# Patient Record
Sex: FEMALE | Race: WHITE | NOT HISPANIC OR LATINO | Employment: UNEMPLOYED | URBAN - METROPOLITAN AREA
[De-identification: names, ages, dates, MRNs, and addresses within clinical notes are randomized per-mention and may not be internally consistent; named-entity substitution may affect disease eponyms.]

---

## 2017-02-28 ENCOUNTER — OFFICE VISIT - HEALTHEAST (OUTPATIENT)
Dept: FAMILY MEDICINE | Facility: CLINIC | Age: 32
End: 2017-02-28

## 2017-02-28 DIAGNOSIS — J34.89 SINUS PRESSURE: ICD-10-CM

## 2017-02-28 DIAGNOSIS — J06.9 URI (UPPER RESPIRATORY INFECTION): ICD-10-CM

## 2017-09-17 ENCOUNTER — HEALTH MAINTENANCE LETTER (OUTPATIENT)
Age: 32
End: 2017-09-17

## 2018-01-31 ENCOUNTER — OFFICE VISIT - HEALTHEAST (OUTPATIENT)
Dept: FAMILY MEDICINE | Facility: CLINIC | Age: 33
End: 2018-01-31

## 2018-01-31 DIAGNOSIS — Z72.820 POOR SLEEP: ICD-10-CM

## 2018-01-31 DIAGNOSIS — F41.9 ANXIETY: ICD-10-CM

## 2018-06-21 ENCOUNTER — OFFICE VISIT - HEALTHEAST (OUTPATIENT)
Dept: FAMILY MEDICINE | Facility: CLINIC | Age: 33
End: 2018-06-21

## 2018-06-21 DIAGNOSIS — N89.8 VAGINAL DISCHARGE: ICD-10-CM

## 2018-06-21 LAB
CLUE CELLS: NORMAL
TRICHOMONAS, WET PREP: NORMAL
YEAST, WET PREP: NORMAL

## 2018-06-22 ENCOUNTER — AMBULATORY - HEALTHEAST (OUTPATIENT)
Dept: LAB | Facility: CLINIC | Age: 33
End: 2018-06-22

## 2018-06-22 DIAGNOSIS — F25.0 SCHIZOAFFECTIVE DISORDER, BIPOLAR TYPE (H): ICD-10-CM

## 2018-06-22 DIAGNOSIS — F41.9 ANXIETY DISORDER: ICD-10-CM

## 2018-06-22 LAB
C TRACH DNA SPEC QL PROBE+SIG AMP: NEGATIVE
N GONORRHOEA DNA SPEC QL NAA+PROBE: NEGATIVE

## 2018-08-15 ENCOUNTER — OFFICE VISIT - HEALTHEAST (OUTPATIENT)
Dept: FAMILY MEDICINE | Facility: CLINIC | Age: 33
End: 2018-08-15

## 2018-08-15 DIAGNOSIS — N76.0 BV (BACTERIAL VAGINOSIS): ICD-10-CM

## 2018-08-15 DIAGNOSIS — N89.8 VAGINAL DISCHARGE: ICD-10-CM

## 2018-08-15 DIAGNOSIS — B96.89 BV (BACTERIAL VAGINOSIS): ICD-10-CM

## 2018-08-15 LAB
CLUE CELLS: ABNORMAL
TRICHOMONAS, WET PREP: ABNORMAL
YEAST, WET PREP: ABNORMAL

## 2018-09-24 ENCOUNTER — HEALTH MAINTENANCE LETTER (OUTPATIENT)
Age: 33
End: 2018-09-24

## 2019-06-11 ENCOUNTER — OFFICE VISIT - HEALTHEAST (OUTPATIENT)
Dept: FAMILY MEDICINE | Facility: CLINIC | Age: 34
End: 2019-06-11

## 2019-06-11 DIAGNOSIS — K21.9 GASTROESOPHAGEAL REFLUX DISEASE, ESOPHAGITIS PRESENCE NOT SPECIFIED: ICD-10-CM

## 2019-06-13 ENCOUNTER — COMMUNICATION - HEALTHEAST (OUTPATIENT)
Dept: FAMILY MEDICINE | Facility: CLINIC | Age: 34
End: 2019-06-13

## 2019-08-29 ENCOUNTER — OFFICE VISIT - HEALTHEAST (OUTPATIENT)
Dept: FAMILY MEDICINE | Facility: CLINIC | Age: 34
End: 2019-08-29

## 2019-08-29 DIAGNOSIS — N92.6 ABNORMAL MENSTRUAL CYCLE: ICD-10-CM

## 2019-08-29 LAB — HCG UR QL: NEGATIVE

## 2019-11-25 ENCOUNTER — COMMUNICATION - HEALTHEAST (OUTPATIENT)
Dept: SCHEDULING | Facility: CLINIC | Age: 34
End: 2019-11-25

## 2019-11-26 ENCOUNTER — COMMUNICATION - HEALTHEAST (OUTPATIENT)
Dept: FAMILY MEDICINE | Facility: CLINIC | Age: 34
End: 2019-11-26

## 2019-12-19 ENCOUNTER — RECORDS - HEALTHEAST (OUTPATIENT)
Dept: EMERGENCY MEDICINE | Facility: HOSPITAL | Age: 34
End: 2019-12-19

## 2019-12-19 ENCOUNTER — RECORDS - HEALTHEAST (OUTPATIENT)
Dept: ADMINISTRATIVE | Facility: OTHER | Age: 34
End: 2019-12-19

## 2019-12-23 LAB
ATRIAL RATE - MUSE: 86 BPM
DIASTOLIC BLOOD PRESSURE - MUSE: NORMAL
INTERPRETATION ECG - MUSE: NORMAL
P AXIS - MUSE: 52 DEGREES
PR INTERVAL - MUSE: 156 MS
QRS DURATION - MUSE: 82 MS
QT - MUSE: 366 MS
QTC - MUSE: 437 MS
R AXIS - MUSE: 59 DEGREES
SYSTOLIC BLOOD PRESSURE - MUSE: NORMAL
T AXIS - MUSE: 22 DEGREES
VENTRICULAR RATE- MUSE: 86 BPM

## 2020-06-13 ENCOUNTER — HOSPITAL ENCOUNTER (OUTPATIENT)
Dept: OBGYN | Facility: HOSPITAL | Age: 35
Discharge: HOME | End: 2020-06-13
Attending: OBSTETRICS & GYNECOLOGY | Admitting: OBSTETRICS & GYNECOLOGY

## 2020-06-13 DIAGNOSIS — R10.84 ABDOMINAL PAIN, GENERALIZED: ICD-10-CM

## 2020-06-13 DIAGNOSIS — G47.9 SLEEP DISORDER: ICD-10-CM

## 2020-06-13 LAB
ALBUMIN UR-MCNC: NEGATIVE MG/DL
APPEARANCE UR: CLEAR
BILIRUB UR QL STRIP: NEGATIVE
COLOR UR AUTO: YELLOW
GLUCOSE UR STRIP-MCNC: NEGATIVE MG/DL
HGB UR QL STRIP: NEGATIVE
KETONES UR STRIP-MCNC: NEGATIVE MG/DL
LEUKOCYTE ESTERASE UR QL STRIP: NEGATIVE
NITRATE UR QL: NEGATIVE
PH UR STRIP: 7 [PH] (ref 4.5–8)
SP GR UR STRIP: 1 (ref 1–1.03)
UROBILINOGEN UR STRIP-ACNC: NORMAL

## 2020-06-13 ASSESSMENT — MIFFLIN-ST. JEOR: SCORE: 1588.12

## 2020-07-18 ENCOUNTER — HOSPITAL ENCOUNTER (OUTPATIENT)
Dept: OBGYN | Facility: HOSPITAL | Age: 35
Discharge: HOME | End: 2020-07-19
Attending: OBSTETRICS & GYNECOLOGY | Admitting: OBSTETRICS & GYNECOLOGY

## 2020-07-19 LAB — RUPTURE OF FETAL MEMBRANES BY ROM PLUS: NEGATIVE

## 2020-07-24 ENCOUNTER — AMBULATORY - HEALTHEAST (OUTPATIENT)
Dept: OBGYN | Facility: CLINIC | Age: 35
End: 2020-07-24

## 2020-07-24 DIAGNOSIS — Z33.1 PREGNANCY, INCIDENTAL: ICD-10-CM

## 2020-07-27 ENCOUNTER — AMBULATORY - HEALTHEAST (OUTPATIENT)
Dept: FAMILY MEDICINE | Facility: CLINIC | Age: 35
End: 2020-07-27

## 2020-07-27 DIAGNOSIS — Z33.1 PREGNANCY, INCIDENTAL: ICD-10-CM

## 2020-07-27 LAB
SARS-COV-2 PCR COMMENT: NORMAL
SARS-COV-2 RNA SPEC QL NAA+PROBE: NEGATIVE
SARS-COV-2 VIRUS SPECIMEN SOURCE: NORMAL

## 2020-07-29 ENCOUNTER — HOSPITAL ENCOUNTER (OUTPATIENT)
Dept: OBGYN | Facility: HOSPITAL | Age: 35
Discharge: HOME | End: 2020-07-29

## 2020-07-30 ENCOUNTER — ANESTHESIA - HEALTHEAST (OUTPATIENT)
Dept: OBGYN | Facility: HOSPITAL | Age: 35
End: 2020-07-30

## 2020-07-31 ENCOUNTER — HOME CARE/HOSPICE - HEALTHEAST (OUTPATIENT)
Dept: HOME HEALTH SERVICES | Facility: HOME HEALTH | Age: 35
End: 2020-07-31

## 2020-07-31 ENCOUNTER — COMMUNICATION - HEALTHEAST (OUTPATIENT)
Dept: SCHEDULING | Facility: CLINIC | Age: 35
End: 2020-07-31

## 2020-08-02 ENCOUNTER — HOME CARE/HOSPICE - HEALTHEAST (OUTPATIENT)
Dept: HOME HEALTH SERVICES | Facility: HOME HEALTH | Age: 35
End: 2020-08-02

## 2020-11-25 ENCOUNTER — COMMUNICATION - HEALTHEAST (OUTPATIENT)
Dept: SCHEDULING | Facility: CLINIC | Age: 35
End: 2020-11-25

## 2020-12-27 ENCOUNTER — OFFICE VISIT - HEALTHEAST (OUTPATIENT)
Dept: FAMILY MEDICINE | Facility: CLINIC | Age: 35
End: 2020-12-27

## 2020-12-27 ENCOUNTER — AMBULATORY - HEALTHEAST (OUTPATIENT)
Dept: FAMILY MEDICINE | Facility: CLINIC | Age: 35
End: 2020-12-27

## 2020-12-27 DIAGNOSIS — Z20.822 SUSPECTED COVID-19 VIRUS INFECTION: ICD-10-CM

## 2020-12-29 ENCOUNTER — COMMUNICATION - HEALTHEAST (OUTPATIENT)
Dept: SCHEDULING | Facility: CLINIC | Age: 35
End: 2020-12-29

## 2021-01-20 ENCOUNTER — SURGERY - HEALTHEAST (OUTPATIENT)
Dept: SURGERY | Facility: HOSPITAL | Age: 36
End: 2021-01-20

## 2021-01-20 ENCOUNTER — COMMUNICATION - HEALTHEAST (OUTPATIENT)
Dept: SCHEDULING | Facility: CLINIC | Age: 36
End: 2021-01-20

## 2021-01-20 ENCOUNTER — ANESTHESIA - HEALTHEAST (OUTPATIENT)
Dept: SURGERY | Facility: HOSPITAL | Age: 36
End: 2021-01-20

## 2021-01-20 ENCOUNTER — HOSPITAL ENCOUNTER (EMERGENCY)
Dept: SURGERY | Facility: HOSPITAL | Age: 36
Discharge: HOME | End: 2021-01-20
Attending: STUDENT IN AN ORGANIZED HEALTH CARE EDUCATION/TRAINING PROGRAM | Admitting: SPECIALIST
Payer: COMMERCIAL

## 2021-01-20 DIAGNOSIS — K80.50 BILIARY COLIC: ICD-10-CM

## 2021-01-20 DIAGNOSIS — K80.20 CALCULUS OF GALLBLADDER WITHOUT CHOLECYSTITIS WITHOUT OBSTRUCTION: ICD-10-CM

## 2021-01-20 DIAGNOSIS — K81.0 ACUTE CHOLECYSTITIS: ICD-10-CM

## 2021-01-20 LAB
ALBUMIN SERPL-MCNC: 4.2 G/DL (ref 3.5–5)
ALBUMIN UR-MCNC: NEGATIVE MG/DL
ALP SERPL-CCNC: 92 U/L (ref 45–120)
ALT SERPL W P-5'-P-CCNC: 11 U/L (ref 0–45)
ANION GAP SERPL CALCULATED.3IONS-SCNC: 8 MMOL/L (ref 5–18)
APPEARANCE UR: CLEAR
AST SERPL W P-5'-P-CCNC: 16 U/L (ref 0–40)
BILIRUB DIRECT SERPL-MCNC: 0.1 MG/DL
BILIRUB SERPL-MCNC: 0.3 MG/DL (ref 0–1)
BILIRUB UR QL STRIP: NEGATIVE
BUN SERPL-MCNC: 14 MG/DL (ref 8–22)
CALCIUM SERPL-MCNC: 9.7 MG/DL (ref 8.5–10.5)
CHLORIDE BLD-SCNC: 107 MMOL/L (ref 98–107)
CO2 SERPL-SCNC: 24 MMOL/L (ref 22–31)
COLOR UR AUTO: YELLOW
CREAT SERPL-MCNC: 0.7 MG/DL (ref 0.6–1.1)
ERYTHROCYTE [DISTWIDTH] IN BLOOD BY AUTOMATED COUNT: 13.3 % (ref 11–14.5)
GFR SERPL CREATININE-BSD FRML MDRD: >60 ML/MIN/1.73M2
GLUCOSE BLD-MCNC: 93 MG/DL (ref 70–125)
GLUCOSE UR STRIP-MCNC: NEGATIVE MG/DL
HCG UR QL: NEGATIVE
HCT VFR BLD AUTO: 38.7 % (ref 35–47)
HGB BLD-MCNC: 12.6 G/DL (ref 12–16)
HGB UR QL STRIP: NEGATIVE
KETONES UR STRIP-MCNC: NEGATIVE MG/DL
LEUKOCYTE ESTERASE UR QL STRIP: NEGATIVE
LIPASE SERPL-CCNC: 40 U/L (ref 0–52)
MCH RBC QN AUTO: 30.9 PG (ref 27–34)
MCHC RBC AUTO-ENTMCNC: 32.6 G/DL (ref 32–36)
MCV RBC AUTO: 95 FL (ref 80–100)
NITRATE UR QL: NEGATIVE
PH UR STRIP: 7 [PH] (ref 4.5–8)
PLATELET # BLD AUTO: 284 THOU/UL (ref 140–440)
PMV BLD AUTO: 10.8 FL (ref 8.5–12.5)
POC PREG URINE (HCG) HE - HISTORICAL: NEGATIVE
POCT KIT EXPIRATION DATE HE - HISTORICAL: NORMAL
POCT KIT LOT NUMBER HE - HISTORICAL: NORMAL
POCT NEGATIVE CONTROL HE - HISTORICAL: NORMAL
POCT POSITIVE CONTROL HE - HISTORICAL: NORMAL
POTASSIUM BLD-SCNC: 3.8 MMOL/L (ref 3.5–5)
PROT SERPL-MCNC: 7.5 G/DL (ref 6–8)
RBC # BLD AUTO: 4.08 MILL/UL (ref 3.8–5.4)
SARS-COV-2 PCR RESULT-HE - HISTORICAL: POSITIVE
SODIUM SERPL-SCNC: 139 MMOL/L (ref 136–145)
SP GR UR STRIP: 1.01 (ref 1–1.03)
UROBILINOGEN UR STRIP-ACNC: NORMAL
WBC: 10.3 THOU/UL (ref 4–11)

## 2021-01-20 ASSESSMENT — MIFFLIN-ST. JEOR
SCORE: 1420.29
SCORE: 1420.29

## 2021-01-21 ENCOUNTER — COMMUNICATION - HEALTHEAST (OUTPATIENT)
Dept: SCHEDULING | Facility: CLINIC | Age: 36
End: 2021-01-21

## 2021-01-22 LAB
LAB AP CHARGES (HE HISTORICAL CONVERSION): NORMAL
PATH REPORT.COMMENTS IMP SPEC: NORMAL
PATH REPORT.FINAL DX SPEC: NORMAL
PATH REPORT.GROSS SPEC: NORMAL
PATH REPORT.MICROSCOPIC SPEC OTHER STN: NORMAL
PATH REPORT.RELEVANT HX SPEC: NORMAL
RESULT FLAG (HE HISTORICAL CONVERSION): NORMAL

## 2021-01-26 ENCOUNTER — COMMUNICATION - HEALTHEAST (OUTPATIENT)
Dept: SURGERY | Facility: CLINIC | Age: 36
End: 2021-01-26

## 2021-05-28 ENCOUNTER — RECORDS - HEALTHEAST (OUTPATIENT)
Dept: ADMINISTRATIVE | Facility: CLINIC | Age: 36
End: 2021-05-28

## 2021-06-05 ENCOUNTER — HEALTH MAINTENANCE LETTER (OUTPATIENT)
Age: 36
End: 2021-06-05

## 2021-07-15 VITALS
HEART RATE: 68 BPM | RESPIRATION RATE: 16 BRPM | SYSTOLIC BLOOD PRESSURE: 130 MMHG | DIASTOLIC BLOOD PRESSURE: 88 MMHG | TEMPERATURE: 98 F

## 2021-07-15 VITALS — BODY MASS INDEX: 27.91 KG/M2 | WEIGHT: 172.9 LBS

## 2021-07-15 VITALS — WEIGHT: 163.3 LBS | BODY MASS INDEX: 26.36 KG/M2

## 2021-07-15 VITALS — WEIGHT: 176 LBS | BODY MASS INDEX: 28.41 KG/M2

## 2021-07-15 VITALS — WEIGHT: 177 LBS | BODY MASS INDEX: 28.57 KG/M2

## 2021-07-15 VITALS — WEIGHT: 175 LBS | BODY MASS INDEX: 28.25 KG/M2

## 2021-07-15 VITALS
HEIGHT: 66 IN | BODY MASS INDEX: 24.75 KG/M2 | WEIGHT: 154 LBS | HEIGHT: 66 IN | BODY MASS INDEX: 24.75 KG/M2 | WEIGHT: 154 LBS

## 2021-07-15 VITALS — WEIGHT: 191 LBS | HEIGHT: 66 IN | BODY MASS INDEX: 30.7 KG/M2

## 2021-07-15 VITALS — WEIGHT: 170.6 LBS | BODY MASS INDEX: 27.54 KG/M2

## 2021-07-15 VITALS — WEIGHT: 178.8 LBS | BODY MASS INDEX: 28.86 KG/M2

## 2021-07-15 NOTE — ED TRIAGE NOTES
Patient developed RUQ abdominal pain and nausea at 0200. Took Tums and acetaminophen with no relief. Patient states that she had a similar episode last weekend.

## 2021-07-15 NOTE — TELEPHONE ENCOUNTER
Telephone Encounter by Suzie Hdez at 6/14/2019  2:35 PM     Author: Suzie Hdez Service: -- Author Type: --    Filed: 6/14/2019  2:37 PM Encounter Date: 6/13/2019 Status: Signed    : Suzie Hdez       PRIOR AUTHORIZATION DENIED    Denial Rational: Patient is able to get up to 1 capsule per day for a maximum of 120 days within 365 days.  This is the duration set by patient's plan for PPIs.  This limit is shared across all PPIs.   Also, dose can be made with a lower number of a higher strength.  She can use one 40mg capsule instead of two 20mg capsules         Appeal Information: This medication was denied. If physician would like to appeal because patient has contraindication or allergy to covered medication please write letter of medical necessity and route back to PA team to initiate.  If no further action is needed please close encounter thank you.
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
## 2021-07-15 NOTE — ANESTHESIA POSTPROCEDURE EVALUATION
Patient: Rebecca Bobo  * No procedures listed *  Anesthesia type: epidural    Patient location: Labor and Delivery  Last vitals: No vitals data found for the desired time range.    Post vital signs: stable  Level of consciousness: awake and responds to simple questions  Post-anesthesia pain: pain controlled  Post-anesthesia nausea and vomiting: no  Pulmonary: unassisted, return to baseline  Cardiovascular: stable and blood pressure at baseline  Hydration: adequate  Anesthetic events: no    QCDR Measures:  ASA# 11 - Kizzy-op Cardiac Arrest: ASA11B - Patient did NOT experience unanticipated cardiac arrest  ASA# 12 - Kizzy-op Mortality Rate: ASA12B - Patient did NOT die  ASA# 13 - PACU Re-Intubation Rate: NA - No ETT / LMA used for case  ASA# 10 - Composite Anes Safety: ASA10A - No serious adverse event    Additional Notes:    Patient is comfortable s/p labor epidural. Patient is satisfied with her anesthetic and recovering well. Patient's sensory and motor function in LE are returning to baseline, emptying bladder as expected, minimal to no tenderness at neuraxial insertion site. Advised patient to alert her nurses, physicians, department of anesthesia if anything changes.

## 2021-07-15 NOTE — TELEPHONE ENCOUNTER
New Appointment Needed  What is the reason for the visit:    Pregnancy Confirmation Appt Needed  When was the first day of your last menstrual cycle?: 10/23/19  Have you done a home pregnancy test?: Yes, three-all positive     Provider Preference: Any available  How soon do you need to be seen?: As soon as possible   Okay to leave a detailed message:  Yes

## 2021-07-15 NOTE — OP NOTE
Operative Note    Name:  Rebecca Bobo  PCP:  Provider, No Primary Care  Procedure Date:  1/20/2021      Procedure:  CHOLECYSTECTOMY, LAPAROSCOPIC    Pre-Procedure Diagnosis:  Acute cholecystitis [K81.0]     Post-Procedure Diagnosis:    Same     Surgeon(s):  Zeina Rodríguez MD    Assistant:      Anesthesia Type:  General      Findings:  Somewhat distended gallbladder with multiple stones but no significant inflammation.    Operative Report:    The patient was properly identified and brought to the operating suite where they were placed in the supine position, general anesthetic was administered and prepped and draped in a sterile fashion.  A small incision was made below the umbilicus and a Veress needle passed into the abdominal cavity which was insufflated with carbon dioxide.  A 5mm trocar port was then placed followed by the camera.  Under direct vision a 10 mm port was placed in the epigastrium and two 5 mm ports in the right upper quadrant.  The gallbladder was visualized.  With traction on the gallbladder dissection was carried out in the triangle of Calot where the cystic duct and artery were easily identified clipped and divided.  The gallbladder was removed from the gallbladder bed with electrocautery with no difficulty and pulled up through the epigastric incision.  The port was replaced and the entire area irrigated until clear.  Hemostasis was assured.  All the ports removed and each site closed with subcuticular sutures of 4-0 Monocryl.  Sterile dressings were placed.  Each site was also infiltrated with quarter percent Marcaine for a total of 20 mL.  The patient tolerated the procedure well.      Estimated Blood Loss:   10 mL from 1/20/2021  2:42 PM to 1/20/2021  3:42 PM    Specimens:    ID Type Source Tests Collected by Time   A :  Tissue Gallbladder SURGICAL PATHOLOGY EXAM Zeina Rodríguez MD 1/20/2021 1525          Drains:        Complications:    None    Zeina Rodríguez     Date: 1/20/2021   Time: 3:51 PM
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
## 2021-07-15 NOTE — ANESTHESIA POSTPROCEDURE EVALUATION
Patient: Rebecca Bobo  Procedure(s):  CHOLECYSTECTOMY, LAPAROSCOPIC  Anesthesia type: general    Patient location: PACU  Last vitals:   Vitals Value Taken Time   /77 01/20/21 1800   Temp 37.1  C (98.7  F) 01/20/21 1800   Pulse 66 01/20/21 1800   Resp 16 01/20/21 1800   SpO2 95 % 01/20/21 1800     Post vital signs: stable  Level of consciousness: awake and responds to simple questions  Post-anesthesia pain: pain controlled  Post-anesthesia nausea and vomiting: no  Pulmonary: unassisted, return to baseline  Cardiovascular: stable and blood pressure at baseline  Hydration: adequate  Anesthetic events: no    QCDR Measures:  ASA# 11 - Kizzy-op Cardiac Arrest: ASA11B - Patient did NOT experience unanticipated cardiac arrest  ASA# 12 - Kizzy-op Mortality Rate: ASA12B - Patient did NOT die  ASA# 13 - PACU Re-Intubation Rate: ASA13B - Patient did NOT require a new airway mgmt  ASA# 10 - Composite Anes Safety: ASA10A - No serious adverse event    Additional Notes:

## 2021-07-15 NOTE — TELEPHONE ENCOUNTER
Post-Op Phone Call               Surgeon: Dr. Anne M.D..    Date of Surgery: 1/21/2021   Discharge Date: 1/21/2021    Date/Time Called:   Date: 1/26/2021 Time: 11:55 AM   Attempt: First    Notes:  Doing very well. Minimal pain. Discussed bandage care.     Return to Work Plans?  Expected date: N/A  Do you need anything from us in this regard? No     Post-op appointment made? No        Thank you for your time. Please do not hesitate to call us with any questions or concerns.    Call completed by: Esme Perez

## 2021-07-15 NOTE — TELEPHONE ENCOUNTER
"Pt is calling in about chest pain she is having in her right chest, into her right arm and shoulder. Pt reports she had surgery yesterday, tried to call her surgeon, but was unable to get a hold of him.  Pt reports pain is on and off, and only when she is laying down. Pt reports pain is \"10\". Pt reports if she is laying down for 4 hours it hurts continuously for the 4 hours. Pt also reports difficulty breathing when she is laying down, and is worse when the pain comes on.  Care advice given, and per protocol pt should be evaluated in the ER. Pt does not have a PCP, and has not been able to get a hold of her surgeon. Pt was advised to call back if she has further questions.     Cruz Villarreal RN [facility] Triage/Medication Refill    Reason for Disposition    Pain also present in shoulder(s) or arm(s) or jaw    SEVERE chest pain    Major surgery in the past month    Additional Information    Negative: SEVERE difficulty breathing (e.g., struggling for each breath, speaks in single words)    Negative: Passed out (i.e., fainted, collapsed and was not responding)    Negative: Chest pain lasting longer than 5 minutes and ANY of the following:* Over 50 years old* Over 30 years old and at least one cardiac risk factor (i.e., high blood pressure, diabetes, high cholesterol, obesity, smoker or strong family history of heart disease)* Pain is crushing, pressure-like, or heavy * Took nitroglycerin and chest pain was not relieved* History of heart disease (i.e., angina, heart attack, bypass surgery, angioplasty, CHF)    Negative: Visible sweat on face or sweat dripping down face    Negative: Sounds like a life-threatening emergency to the triager    Negative: Followed an injury to chest    Negative: Difficulty breathing    Negative: Cocaine use within last 3 days    Negative: History of prior 'blood clot' in leg or lungs (i.e., deep vein thrombosis, pulmonary embolism)    Negative: Recent illness requiring prolonged bed " rest (i.e., immobilization)    Negative: Hip or leg fracture in past 2 months (e.g, or had cast on leg or ankle)    Protocols used: CHEST PAIN-A-OH
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
## 2021-07-15 NOTE — ANESTHESIA CARE TRANSFER NOTE
Last vitals:   Vitals:    01/20/21 1600   BP: (!) 149/96   Pulse: 86   Resp: 16   Temp:    SpO2: 96%   Temp 97.3    Patient's level of consciousness is awake  Spontaneous respirations: yes  Maintains airway independently: yes  Dentition unchanged: yes  Oropharynx: oropharynx clear of all foreign objects    QCDR Measures:  ASA# 20 - Surgical Safety Checklist: WHO surgical safety checklist completed prior to induction    PQRS# 430 - Adult PONV Prevention: 4558F - Pt received => 2 anti-emetic agents (different classes) preop & intraop  ASA# 8 - Peds PONV Prevention: NA - Not pediatric patient, not GA or 2 or more risk factors NOT present  PQRS# 424 - Kizzy-op Temp Management: 4559F - At least one body temp DOCUMENTED => 35.5C or 95.9F within required timeframe  PQRS# 426 - PACU Transfer Protocol: - Transfer of care checklist used  ASA# 14 - Acute Post-op Pain: ASA14B - Patient did NOT experience pain >= 7 out of 10

## 2021-07-15 NOTE — PATIENT INSTRUCTIONS - HE
Patient Instructions by April Ordaz MD at 8/29/2019  1:35 PM     Author: April Ordaz MD Service: -- Author Type: Physician    Filed: 8/29/2019  3:32 PM Encounter Date: 8/29/2019 Status: Addendum    : April Ordaz MD (Physician)    Related Notes: Original Note by April Ordaz MD (Physician) filed at 8/29/2019  3:31 PM       1. Follow up with gynecologist within the next 1-2 weeks for menstrual cycles issues  2. If you have any non- ob/gyn concerns, try and be seen at your primary clinic. Or you can be seen by any primary care provider at one of our other [facility] sites  3. If you have any questions, call the clinic number - the number is answered 24/7    Patient Education     Understanding the Normal Menstrual Cycle  Having a period (menstruation) is a normal, healthy part of being a woman. Its also part of the menstrual cycle, a process that makes it possible for women to become pregnant. The first day of your period is the first day of your menstrual cycle.   A woman who has irregular cycles can become pregnant during bleeding. This may not be a true menstrual period.   An egg is released    Eggs are female reproductive cells stored in the ovaries. During each cycle, a woman's hormones trigger an egg, (usually 1), to mature and be released from an ovary. This is called ovulation. The egg then travels from the ovary to a fallopian tube.  The egg travels through a tube  The egg moves through the fallopian tube toward the uterus. If sperm are present in the tube, the egg may be fertilized, and pregnancy could result.  The uterine lining grows thicker  The lining of the uterus is made up of blood, tissue, and fluid. During each cycle, hormones cause the lining to thicken. This helps prepare the uterus to receive and nourish a fertilized egg.   The egg and lining are shed  If pregnancy doesnt happen, the egg and thickened lining of the uterus are no longer needed. They are then shed through  the vagina. This is called a menstrual period.  How long is each cycle?  It is normal for a cycle to take 21 to 34 days. For teenagers, the time between periods might be as much as 45 days. For adults, it will be around a month from the first day of one period to the first day of the next. Thats why you may hear women talk about a monthly cycle, even though cycle length can vary from one month to another, and anywhere from 3 to 5 weeks is normal. Not everyone has a 28-day cycle.    How long does a period last?  Its normal for a period to last 2 to 7 days. Talk to your healthcare provider if your period lasts longer than 7 days for 2 cycles in a row.  Date Last Reviewed: 12/1/2016 2000-2017 [employer]. [address] [place] [address], [place] [address]. All rights reserved. This information is not intended as a substitute for professional medical care. Always follow your healthcare professional's instructions.     Patient Education     Improving Your Fertility  Your doctor may suggest some simple methods to help you get pregnant. Most focus on predicting ovulation. This is the time when sex has the best chance of success. Your doctor may also advise working on other factors that can affect fertility.  Predicting ovulation  Conception is only possible when a woman is ovulating. One signal of ovulation is a surge in the hormone LH. Other signals include changes in body temperature and changes in cervical mucus.  Ovulation predictor kits  One of the best signals of ovulation is a sudden increase in the hormone LH. A simple urine test called an ovulation predictor kit is available at [facility]. It can help pinpoint this surge. Have sex the day you notice the surge. Keep having sex daily over the next 3 days, or as often as your doctor suggests.  Body temperature changes  A womans resting temperature or basal body temperature increases after ovulation.  By this point in the cycle, having sex will not  increase your chances of getting pregnant. However, it is an indication that ovulation occurred.   Cervical mucus changes  Shortly before ovulation a womans cervical mucus gets clear and stretchy. The mucus also increases in quantity. This makes it easier for sperm to travel through the cervix. Not all women notice these changes. But if you do, begin having sex daily until the mucus thickens again.  Working on other factors that affect fertility  Certain lifestyle and health habits can affect fertility. Be sure to talk with your doctor about these issues. You may be able to make some simple changes to improve your chances of conception. Keep in mind, though, that it can take time for healthy changes to improve your fertility.  Weight problems  Being overweight or underweight can affect hormone levels. In women, this can impair ovulation. In men, obesity can decrease sperm count.  Medications, supplements, and herbal remedies  Medications, supplements, and herbal remedies can affect hormone levels in men and women. They can also affect the quantity and quality of sperm. Be sure to tell your doctor about any substances you take.  Sexually transmitted diseases  Sexually transmitted diseases (STDs) can scar the reproductive organs in both men and women. If youve had an STD, be sure to tell your doctor.  Testicular heat  A twyla testicles are normally a few degrees cooler than the rest of his body. When the testicles are too warm, sperm production may decline. Try to avoid excess heat from things like hot tubs and saunas.  Smoking, alcohol, and drugs  Smoking can affect estrogen levels and decrease egg production in women. Men who smoke may have lowered sperm count. Excessive alcohol or drug use can also decrease fertility in both men and women.   Chemical exposure  Certain chemicals can affect hormone levels. Talk with your doctor if youre regularly exposed to strong chemicals. You should also ask about lubricants used  during sex. Some types can be toxic to sperm.  Other factors  Excessive exercise can decrease hormone production. It can also cause irregular menstruation in women. Ongoing stress is another factor that may affect fertility and cause ovulation problems.  Date Last Reviewed: 5/21/2015 2000-2017 [employer]. [address] [place] [address]. All rights reserved. This information is not intended as a substitute for professional medical care. Always follow your healthcare professional's instructions.

## 2021-07-15 NOTE — PROGRESS NOTES
Assessment and Plan     Rebecca was seen today for poss bacterial infection.    Diagnoses and all orders for this visit:    Vaginal discharge  -     Wet Prep, Vaginal  -     Chlamydia trachomatis & Neisseria gonorrhoeae, Amplified Detection         HPI     Chief Complaint   Patient presents with     poss bacterial infection     Pt is prone to BV. Notice the difference in odor and discharge yesterday. Was having intercourse yesterday and smelt different.        Rebecca Mata is a 32 y.o. female seen today for vaginal discharge and odor yesterday that seems improved today.  She is with a new sexual partner after being abstinent for quite some time.  She does have a history of frequent BV although it is been more than a year since her last episode.  She does feel that this episode is similar to previous episodes of BV.     No current outpatient prescriptions on file.     Reviewed and updated: medical history, medications and allergies.     Review of Systems     General: Denies fever, chills, fatigue.  GI: Denies nausea, vomiting, diarrhea, constipation, abdominal/pelvic pain.  : Denies dysuria, polyuria, dyspareunia.     Objective     Vitals:    06/21/18 1800   BP: 120/80   Patient Site: Right Arm   Patient Position: Sitting   Cuff Size: Adult Regular   Pulse: 84   Resp: 16   Temp: 97.9  F (36.6  C)   TempSrc: Oral   SpO2: 100%   Weight: 172 lb 14.4 oz (78.4 kg)        Reviewed vital signs.  General: Appears calm, comfortable. Answers questions quickly and appropriately with clear speech. No apparent distress.  Skin: Pink, warm, dry.  HENT: Normocephalic, atraumatic.  Neck: Supple.  Cardiovascular: Strong, regular radial pulse.  Respiratory: Normal respiratory effort.  Neuro: Memory and cognition appear normal. Normal gait.  Psych: Mood and affect appear normal.   :  External female genitalia without lesions. Introitus is normal, vaginal walls pink and moist without lesions. Cervix appears healthy and pink.      Imaging:   No results found.    Labs:  Recent Results (from the past 24 hour(s))   Wet Prep, Vaginal   Result Value Ref Range    Yeast Result No yeast seen No yeast seen    Trichomonas No Trichomonas seen No Trichomonas seen    Clue Cells, Wet Prep No Clue cells seen No Clue cells seen        Medical Decision-Making     Rebecca is a generally well-appearing 32-year-old female presents with concern for possible BV.  She describes vaginal odor and discharge yesterday but seems better today.  She is with a new sexual partner after a long period of abstinence is concerned that resuming intercourse may have caused a recurrence of BV.  Her appearance is nontoxic.  She is not tachycardic or tachypneic or febrile.  Exam is essentially unremarkable.  There was small amount of clear to milky discharge present in the vaginal canal.  No vaginal odor was noted.  Wet prep was completely negative.  Gonorrhea and chlamydia are pending.  If her symptoms recur, she will follow-up with her PCP.  Advised her that we will contact her once the gonorrhea and chlamydia results are back.    Reviewed red flags that would trigger a prompt return to the clinic as noted below under patient instructions.  She expressed understanding of these directions and is in agreement with the plan.     Patient Instructions     Patient Instructions   The wet prep swab did not show any signs of bacterial vaginosis, yeast infection, or trichomoniasis.  The gonorrhea and chlamydia test will be back for a couple of days but we will call you as soon as they are ready.  If you continue to have symptoms, I suggest following up with your regular doctor for further evaluation.        Discussed benefit vs risk of medications, dosing, side effects.  Patient was able to verbalize understanding.  After visit summary was provided for patient.     William Waggoner PA-C

## 2021-07-15 NOTE — PROGRESS NOTES
Discharge instructions given to patient and her significant other and they verbalized understanding. Discharged home ambulatory.

## 2021-07-15 NOTE — TELEPHONE ENCOUNTER
Central PA team  [phone]  Pool: HE PA MED ([id number])          PA has been initiated.       PA form completed and faxed insurance via Cover My Meds     Key:  [id number]        Medication: Omeprazole 20MG dr capsules    Insurance:  [facility] Medicaid           Response will be received via fax and may take up to 5-10 business days depending on plan

## 2021-07-15 NOTE — TELEPHONE ENCOUNTER
New Appointment Needed  What is the reason for the visit:    Pregnancy Confirmation Appt Needed  When was the first day of your last menstrual cycle?: Oct 23  Have you done a home pregnancy test?: Yes: took two, both came back positive.    Provider Preference: Any available  How soon do you need to be seen?: any available today before two, or anything tomorrow morning   Waitlist offered?: No  Okay to leave a detailed message:  Yes

## 2021-07-15 NOTE — TELEPHONE ENCOUNTER
Fax received from [facility] pharmacy requesting Prior Authorization    Medication Name:   omeprazole (PRILOSEC) 20 MG capsule 60 capsule 1 6/11/2019     Sig - Route: Take 1 capsule (20 mg total) by mouth 2 (two) times a day. - Oral    Sent to pharmacy as: omeprazole (PRILOSEC) 20 MG capsule    E-Prescribing Status: Receipt confirmed by pharmacy (6/11/2019  5:25 PM CDT)          Insurance Plan: GOSIA   PBM:    Patient ID Number: [id number]    Please start PA process

## 2021-07-15 NOTE — PATIENT INSTRUCTIONS - HE
Dear Rebecca Bobo,    Your symptoms show that you may have coronavirus (COVID-19). This illness can cause fever, cough and trouble breathing. Many people get a mild case and get better on their own. Some people can get very sick.    Will I be tested for COVID-19?  We would like to test you for Covid-19 virus. I have placed orders for this test.     To schedule: go to your [employer] home page and scroll down to the section that says  You have an appointment that needs to be scheduled  and click the large green button that says  Schedule Now  and follow the steps to find the next available openings.    If you are unable to complete these [employer] scheduling steps, please call [phone] to schedule your testing.     Return to work/school/ guidance:  Please let your workplace manager and staffing office know when your quarantine ends     We can t give you an exact date as it depends on the above. You can calculate this on your own or work with your manager/staffing office to calculate this. (For example if you were exposed on 10/4, you would have to quarantine for 14 full days. That would be through 10/18. You could return on 10/19.)      If you receive a positive COVID-19 test result, follow the guidance of the those who are giving you the results. Usually the return to work is 10 (or in some cases 20 days from symptom onset.) If you work at [facility], you must also be cleared by Employee Occupational Health and Safety to return to work.        If you receive a negative COVID-19 test result and did not have a high risk exposure to someone with a known positive COVID-19 test, you can return to work once you're free of fever for 24 hours without fever-reducing medication and your symptoms are improving or resolved.      If you receive a negative COVID-19 test and If you had a high risk exposure to someone who has tested positive for COVID-19 then you can return to work 14 days after your last contact  with the positive individual    Note: If you have ongoing exposure to the covid positive person, this quarantine period may be more than 14 days. (For example, if you are continued to be exposed to your child who tested positive and cannot isolate from them, then the quarantine of 7-14 days can't start until your child is no longer contagious. This is typically 10 days from onset of the child's symptoms. So the total duration may be 17-24 days in this case.)    Sign up for [employer].   We know it's scary to hear that you might have COVID-19. We want to track your symptoms to make sure you're okay over the next 2 weeks. Please look for an email from [employer]--this is a free, online program that we'll use to keep in touch. To sign up, follow the link in the email you will receive. Learn more at [email or web address]    How can I take care of myself?    Get lots of rest. Drink extra fluids (unless a doctor has told you not to)    Take Tylenol (acetaminophen) or ibuprofen for fever or pain. If you have liver or kidney problems, ask your family doctor if it's okay to take Tylenol o ibuprofen    If you have other health problems (like cancer, heart failure, an organ transplant or severe kidney disease): Call your specialty clinic if you don't feel better in the next 2 days.    Know when to call 911. Emergency warning signs include:  o Trouble breathing or shortness of breath  o Pain or pressure in the chest that doesn't go away  o Feeling confused like you haven't felt before, or not being able to wake up  o Bluish-colored lips or face    Where can I get more information?  [facility] - About COVID-19:   [email or web address]    CDC - What to Do If You're Sick:   www.cdc.gov/coronavirus/2019-ncov/about/steps-when-sick.html      Dear Rebecca Bobo,    Your symptoms show that you may have coronavirus (COVID-19). This illness can cause fever, cough and trouble breathing. Many people  get a mild case and get better on their own. Some people can get very sick.    Will I be tested for COVID-19?  We would like to test you for Covid-19 virus. I have placed orders for this test.     For all employees or close contacts (except [place] and [employer] - see below), go to your [employer] home page and scroll down to the section that says  You have an appointment that needs to be scheduled  and click the large green button that says  Schedule Now  and follow the steps to find the next available opening.     If you are unable to complete these steps or if you cannot find any available times, please call [phone] to schedule employee testing.     [place] employees or close contacts, please call [phone].   [place] ([employer]) employees or close contacts call [phone].    Return to work/school/ guidance:  Please let your workplace manager and staffing office know when your quarantine ends     We can t give you an exact date as it depends on the above. You can calculate this on your own or work with your manager/staffing office to calculate this. (For example if you were exposed on 10/4, you would have to quarantine for 14 full days. That would be through 10/18. You could return on 10/19.)      If you receive a positive COVID-19 test result, follow the guidance of the those who are giving you the results. Usually the return to work is 10 (or in some cases 20 days from symptom onset.) If you work at [facility], you must also be cleared by Employee Occupational Health and Safety to return to work.        If you receive a negative COVID-19 test result and did not have a high risk exposure to someone with a known positive COVID-19 test, you can return to work once you're free of fever for 24 hours without fever-reducing medication and your symptoms are improving or resolved.      If you receive a negative COVID-19 test and If you had a high risk exposure to someone who has tested positive  for COVID-19 then you can return to work 14 days after your last contact with the positive individual    Note: If you have ongoing exposure to the covid positive person, this quarantine period may be more than 14 days. (For example, if you are continued to be exposed to your child who tested positive and cannot isolate from them, then the quarantine of 7-14 days can't start until your child is no longer contagious. This is typically 10 days from onset of the child's symptoms. So the total duration may be 17-24 days in this case.)    Sign up for [employer].   We know it's scary to hear that you might have COVID-19. We want to track your symptoms to make sure you're okay over the next 2 weeks. Please look for an email from [employer]--this is a free, online program that we'll use to keep in touch. To sign up, follow the link in the email you will receive. Learn more at [email or web address]    How can I take care of myself?    Get lots of rest. Drink extra fluids (unless a doctor has told you not to)    Take Tylenol (acetaminophen) or ibuprofen for fever or pain. If you have liver or kidney problems, ask your family doctor if it's okay to take Tylenol o ibuprofen    If you have other health problems (like cancer, heart failure, an organ transplant or severe kidney disease): Call your specialty clinic if you don't feel better in the next 2 days.    Know when to call 911. Emergency warning signs include:  o Trouble breathing or shortness of breath  o Pain or pressure in the chest that doesn't go away  o Feeling confused like you haven't felt before, or not being able to wake up  o Bluish-colored lips or face    Where can I get more information?   [facility] - About COVID-19:   [email or web address]    CDC - What to Do If You're Sick:   www.cdc.gov/coronavirus/2019-ncov/about/steps-when-sick.html

## 2021-07-15 NOTE — ANESTHESIA PREPROCEDURE EVALUATION
Anesthesia Evaluation      Patient summary reviewed   No history of anesthetic complications     Airway   Mallampati: II  Neck ROM: full   Pulmonary - normal exam     ROS comment: smoker                         Cardiovascular - negative ROS and normal exam   Neuro/Psych - negative ROS     Endo/Other    (+) pregnant     GI/Hepatic/Renal - negative ROS           Dental - normal exam                        Anesthesia Plan  Planned anesthetic: epidural  Patient requesting labor epidural. Patient interviewed and examined at the bedside with RN present throughout. Discussed with patient the procedure of epidural placement, expectations, and risks including but not limited to: decreased blood pressure, poor analgesia possibly requiring replacement, post-dural puncture headache, bleeding, infection, nerve damage, and high spinal block. All questions answered. Patient consents to proceed with epidural placement.    ASA 2     Anesthetic plan and risks discussed with: patient    Post-op plan: routine recovery

## 2021-07-15 NOTE — PROGRESS NOTES
ROM-Plus Negative, Dr Less the in house OB notified of patient's arrival, reactive fetal heart tracing, cervical dilation and Negative ROM-Plus. Order given to discharge patient.

## 2021-07-15 NOTE — PROGRESS NOTES
Patient verbalizes wanting to go home. She states that she has a bottle of mag citrate and would like to try that next. Patient would also like Tylenol and vistaril. Dr. Coombs updated, ok for patient to go home with that plan, will write RX.
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
## 2021-07-15 NOTE — PROGRESS NOTES
Patient received GI cocktail at 1635. No relief. Dr. Coombs updated. Instructions to give patient a fleet enema. Patient taken off monitor, given enema and instructions. Patient will call with questions and after BM

## 2021-07-15 NOTE — TELEPHONE ENCOUNTER
Severe pain on top of stomach and then wraps into back    Wakes up in middle of night    Tums, milk, tylenol  -nothing helping    Rates 10+/10  Constant  Comes on suddenly     No fever  No nausea/vomiting    Triaged to a disposition of go to ED. Patient is agreeable.     COVID 19 Nurse Triage Plan/Patient Instructions    Please be aware that novel coronavirus (COVID-19) may be circulating in the community. If you develop symptoms such as fever, cough, or SOB or if you have concerns about the presence of another infection including coronavirus (COVID-19), please contact your health care provider or visit [email or web address].     Disposition/Instructions    ED Visit recommended. Follow protocol based instructions.      Bring Your Own Device:  Please also bring your smart device(s) (smart phones, tablets, laptops) and their charging cables for your personal use and to communicate with your care team during your visit.      Thank you for taking steps to prevent the spread of this virus.  o Limit your contact with others.  o Wear a simple mask to cover your cough.  o Wash your hands well and often.    Resources    [facility]: About COVID-19: [email or web address]    CDC: What to Do If You're Sick: www.cdc.gov/coronavirus/2019-ncov/about/steps-when-sick.html    CDC: Ending Home Isolation: www.cdc.gov/coronavirus/2019-ncov/hcp/disposition-in-home-patients.html     CDC: Caring for Someone: www.cdc.gov/coronavirus/2019-ncov/if-you-are-sick/care-for-someone.html     [facility]: Interim Guidance for Hospital Discharge to Home: www.health.[facility].[place]/diseases/coronavirus/hcp/hospdischarge.pdf    [facility] clinical trials (COVID-19 research studies): clinicalaffairs.[facility].edu/um-clinical-trials     Below are the COVID-19 hotlines at the [place] Department of Health ([facility]). Interpreters are available.   o For health questions: Call 430-955-7982 or 1-820.158.9253 (7 a.m. to 7 p.m.)  o For questions about  "schools and childcare: Call 454-551-0123 or 1-601.347.2929 (7 a.m. to 7 p.m.)      Reason for Disposition    [1] SEVERE pain (e.g., excruciating) AND [2] present > 1 hour    Additional Information    Negative: Shock suspected (e.g., cold/pale/clammy skin, too weak to stand, low BP, rapid pulse)    Negative: Difficult to awaken or acting confused (e.g., disoriented, slurred speech)    Negative: Passed out (i.e., lost consciousness, collapsed and was not responding)    Negative: Sounds like a life-threatening emergency to the triager    Negative: Chest pain    Negative: Pain is mainly in upper abdomen  (if needed ask: \"is it mainly above the belly button?\")    Negative: Followed an abdomen (stomach) injury    Negative: [1] Abdominal pain AND [2] pregnant < 20 weeks    Negative: [1] Abdominal pain AND [2] pregnant > 20 weeks    Negative: [1] Abdominal pain AND [2] postpartum (from 0 to 6 weeks after delivery)    Protocols used: ABDOMINAL PAIN - FEMALE-A-    Jalyn Swan RN Triage Nurse Advisor    "
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
## 2021-07-15 NOTE — ED TRIAGE NOTES
Pt reports chest pain that started this morning when she woke up at 0800. Pt reports she has had constant right sided chest pain all day. Pt states the pain is sharp and hurts worse with deep breaths and when she lays down. Pt denies any other symptoms. Pt reports she is 8 weeks pregnant.

## 2021-07-15 NOTE — PROGRESS NOTES
Patient admitted into triage room 32 to rule out Rupture of membranes. She states she was bending over at home and felt a gush of fluid that stained her underwear. She felt 2 more other gushes from 10:00PM to 10:30PM and so she decided to come in to be evaluated. She is not wearing a pad and does not feeling fluid running down her legs.She felt 1 big contraction at 8:00PM and none since then. Her last pregnancy was 6 years ago.  Hooked up to [facility] to evaluate.

## 2021-07-15 NOTE — TELEPHONE ENCOUNTER
Please call to have patient continue with Zantac (150 mg two times daily) and follow up with her primary care provider. If she runs out of this med (Zantac), it can be purchased over the counter.    I tried calling her about the Omeprazole issue but she did not  so I left a message to call back.
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
## 2021-07-15 NOTE — PROGRESS NOTES
Pt presented to [facility] c/o left upper quadrant pain since last night. Pt is 33.4 weeks , no hx of  labor, denies bleeding or leaking of fluid, denies issues with pregnancy and issues outside of pregnancy. Vss,afebrile, monitors applied, sent UA/UC rating pain 10/10 with movement paged Dr. Coombs.

## 2021-07-15 NOTE — ANESTHESIA PREPROCEDURE EVALUATION
Anesthesia Evaluation      Patient summary reviewed   No history of anesthetic complications     Airway   Mallampati: II  Neck ROM: full   Pulmonary - normal exam   (+) a smoker                         Cardiovascular - negative ROS and normal exam   Neuro/Psych - negative ROS     Endo/Other - negative ROS      GI/Hepatic/Renal - negative ROS     Comments: Acute cholecystitis     Other findings: COVID positive in January H/o COVID post 12/27/20      Dental - normal exam                        Anesthesia Plan  Planned anesthetic: general endotracheal    ASA 2 - emergent   Induction: intravenous   Anesthetic plan and risks discussed with: patient    Post-op plan: routine recovery

## 2021-07-15 NOTE — ANESTHESIA PROCEDURE NOTES
Epidural Block    Patient location during procedure: OB  Time Called: 7/30/2020 12:02 PM  Reason for Block:labor epidural  Staffing:  Performing  Anesthesiologist: Tim Houston MD  Preanesthetic Checklist  Completed: patient identified, risks, benefits, and alternatives discussed, timeout performed, consent obtained, at patient's request, airway assessed, oxygen available, suction available, emergency drugs available and hand hygiene performed  Procedure  Patient position: sitting  Prep: ChloraPrep and site prepped and draped  Patient monitoring: continuous pulse oximetry, heart rate and blood pressure  Approach: midline  Location: L3-L4  Injection technique: SERA saline  Number of Attempts:1  Needle  Needle type: Tuohy   Needle gauge: 17 G     Catheter in Space: 6  Assessment  Sensory level:  No complications      Additional Notes:  SERA @ 6 cm, taped @ 12 cm

## 2021-07-15 NOTE — ED PROVIDER NOTES
EMERGENCY DEPARTMENT ENCOUNTER      NAME: Rebecca Bobo  AGE: 35 y.o. female  YOB: 1985  MRN: [id number]  EVALUATION DATE & TIME: 1/20/2021  6:59 AM    PCP: Provider, No Primary Care    ED PROVIDER: Nery Montoya M.D.    Chief Complaint   Patient presents with     Abdominal Pain       FINAL IMPRESSION:  1. Biliary colic    2. Calculus of gallbladder without cholecystitis without obstruction        ED COURSE & MEDICAL DECISION MAKING:    ED Course as of Jan 20 0947 Wed Jan 20, 2021   0716 35 y.o. old female who presents to the ED for evaluation of upper abdominal pain.   History and physical examination as documented. Vital signs reassuring. She is holding her abdomen and moaning in pain, appears very uncomfortable. Tenderness in the RUQ. Pain wraps around to her back. Most suspicious for biliary colic. Pancreatitis, gastritis/PUD also on the differential. Unlikely to be ACS given patient's age. No lower abdominal pain or urinary symptoms. UA/UPT ordered in triage. UPT negative. We will check labs, given Dilaudid/Zofran for symptom control, and get RUQ ultrasound.        [AB]   0720 No evidence of UTI.   Urinalysis-UC if Indicated for patients > 12 years [AB]   0846 Labs are reassuring.  No leukocytosis.  Her lipase is normal.  LFTs are also normal.    [AB]   0846 Evidence of gallstones with no pericholecystic fluid. No evidence of cholecystitis.    US Abdomen Limited [AB]   0847 I have reassessed the patient twice and she has some improvement with Dilaudid but is still very uncomfortable. Suspect symptomatic cholelithiasis. Discussed with general surgery who agrees she needs cholecystectomy. Patient ok with this plan as well. Will plan to go to the OR from here and then home afterwards to avoid admission. Additional pain medications ordered.     [AB]   0912 Pain is now gone with Dilaudid and GI cocktail. Awaiting surgery.     [AB]   0945 Surprisingly patient is COVID+. She is asymptomatic. This  resulted after patient had left to go to the OR.    SARS-CoV-2 PCR Result(!!): Positive [AB]      ED Course User Index  [AB] Nery Montoya MD       Scribe time stamps  7:02 AM I met with patient to gather history and perform an initial exam. Plans for ED stay discussed. Provider wore surgical mask and gloves during all interactions with patient.   7:51 AM I rechecked patient.   8:24 AM I rechecked patient. She is still having pain. Plan to discuss with general surgery.  8:35 AM I spoke with Dr. Rodríguez, general surgery. She will take patient to OR today.   8:36 AM I updated patient on plan of care. She is agreeable with plan for surgery today. Plan to order 1mg additional Dilaudid.       MEDICATIONS GIVEN IN THE EMERGENCY:  Medications   sodium chloride flush 10 mL (NS) (10 mL Intravenous Given 1/20/21 0800)   piperacillin-tazobactam 3.375 g in NaCl 0.9 % 50 mL (MINI-BAG Plus) (ZOSYN) (3.375 g Intravenous New Bag 1/20/21 0855)   sodium chloride bacteriostatic 0.9 % injection 0.1-0.3 mL (has no administration in time range)   sodium chloride flush 3 mL (NS) (has no administration in time range)   scopolamine 1.5 mg transdermal patch 1 patch (TRANSDERM-SCOP) (has no administration in time range)   sodium chloride 0.9% 1,000 mL (0 mL Intravenous Stopped 1/20/21 0908)   HYDROmorphone injection 0.5 mg (DILAUDID) (0.5 mg Intravenous Given 1/20/21 0724)   ondansetron injection 4 mg (ZOFRAN) (4 mg Intravenous Given 1/20/21 0724)   HYDROmorphone injection 0.5 mg (DILAUDID) (0.5 mg Intravenous Given 1/20/21 0800)   aluminum-magnesium hydroxide-simethicone 15 mL, viscous lidocaine HC 15 mL (GI COCKTAIL) (30 mL Oral Given 1/20/21 0843)   HYDROmorphone injection 1 mg (DILAUDID) (1 mg Intravenous Given 1/20/21 0854)       NEW PRESCRIPTIONS STARTED AT TODAY'S ER VISIT  Current Discharge Medication List      CONTINUE these medications which have NOT CHANGED    Details   acetaminophen (TYLENOL) 325 MG tablet Take 1-2 tablets (325-650 mg  total) by mouth every 4 (four) hours as needed.  Qty: 90 tablet, Refills: 0    Associated Diagnoses:  (normal spontaneous vaginal delivery)      docusate sodium (COLACE) 100 MG capsule Take 1 capsule (100 mg total) by mouth daily.  Qty: 30 capsule, Refills: 0    Associated Diagnoses:  (normal spontaneous vaginal delivery)      ibuprofen (ADVIL,MOTRIN) 800 MG tablet Take 1 tablet (800 mg total) by mouth every 8 (eight) hours.  Qty: 90 tablet, Refills: 0    Associated Diagnoses:  (normal spontaneous vaginal delivery)      phenazopyridine (PYRIDIUM) 200 MG tablet Take 1 tablet (200 mg total) by mouth 3 (three) times a day.  Qty: 6 tablet, Refills: 0    Associated Diagnoses: Acute cystitis without hematuria      prenatal 25/iron fum/folic/dha (PRENATAL-1 ORAL) Take by mouth.              =================================================================    HPI    Patient information was obtained from: Patient    Use of : N/A     Rebecca Bobo is a 35 y.o. female , , with a pertinent history of bacterial vaginosis who presents to this ED via private car accompanied by family member for evaluation of abdominal pain.     Patient presents with 2-3 hours of worsening RUQ and mid-abdominal pain rated 10/10 and radiating to her right back that woke her from sleep at 4:00 AM this morning. Her pain is continuous, described as aching, burning, and stabbing, and is made worse with palpation. She endorses associated nausea. She had the exact same pain one week ago and states that it resolved on it's own after 4 hours and she was not seen by a doctor for it at that time. She endorses emesis associated with her symptoms last week but denies any emesis today. She states that she recently gave birth to her second child and is unsure when her last menstrual period was.     Denies recent fever, chills, diarrhea, emesis, changes in urinary or bowel habits, and has no other medical concerns at this time.      She endorses occasional alcohol use. Denies tobacco or illicit drug use.     REVIEW OF SYSTEMS   Review of Systems   Constitutional: Negative for chills and fever.   Cardiovascular: Negative for chest pain.   Gastrointestinal: Positive for abdominal pain (RUQ, aching, burning, 10/10) and nausea. Negative for diarrhea and vomiting.   Genitourinary: Negative for dysuria, hematuria, vaginal bleeding and vaginal discharge.   All other systems reviewed and are negative.       PAST MEDICAL HISTORY:  Bacterial vaginosis       PAST SURGICAL HISTORY:  S/p appendectomy     CURRENT MEDICATIONS:    No current facility-administered medications on file prior to encounter.      Current Outpatient Medications on File Prior to Encounter   Medication Sig     acetaminophen (TYLENOL) 325 MG tablet Take 1-2 tablets (325-650 mg total) by mouth every 4 (four) hours as needed.     docusate sodium (COLACE) 100 MG capsule Take 1 capsule (100 mg total) by mouth daily.     ibuprofen (ADVIL,MOTRIN) 800 MG tablet Take 1 tablet (800 mg total) by mouth every 8 (eight) hours.     phenazopyridine (PYRIDIUM) 200 MG tablet Take 1 tablet (200 mg total) by mouth 3 (three) times a day.     prenatal 25/iron fum/folic/dha (PRENATAL-1 ORAL) Take by mouth.       ALLERGIES:  Allergies   Allergen Reactions     Codeine Nausea And Vomiting       FAMILY HISTORY:  Family History   Problem Relation Age of Onset     No Medical Problems Mother      No Medical Problems Father      No Medical Problems Sister      No Medical Problems Brother      No Medical Problems Son      No Medical Problems Brother        SOCIAL HISTORY:   Social History     Socioeconomic History     Marital status:      Spouse name: None     Number of children: None     Years of education: None     Highest education level: None   Occupational History     None   Social Needs     Financial resource strain: None     Food insecurity     Worry: None     Inability: None      Transportation needs     Medical: None     Non-medical: None   Tobacco Use     Smoking status: Current Some Day Smoker     Smokeless tobacco: Never Used     Tobacco comment: Smoked three years and stopped 2 years ago.   Substance and Sexual Activity     Alcohol use: Yes     Alcohol/week: 3.3 standard drinks     Types: 4 Standard drinks or equivalent per week     Comment: Works as a , may drink after work.     Drug use: No     Sexual activity: Yes     Partners: Male     Birth control/protection: None     Comment: Recent very brief sexual enounter - [facility] - 1/13/15   Lifestyle     Physical activity     Days per week: None     Minutes per session: None     Stress: None   Relationships     Social connections     Talks on phone: None     Gets together: None     Attends Rastafari service: None     Active member of club or organization: None     Attends meetings of clubs or organizations: None     Relationship status: None     Intimate partner violence     Fear of current or ex partner: None     Emotionally abused: None     Physically abused: None     Forced sexual activity: None   Other Topics Concern     None   Social History Narrative     None       VITALS:  Patient Vitals for the past 24 hrs:   BP Temp Temp src Pulse Resp SpO2 Height Weight   01/20/21 0900 153/89 -- -- 78 -- 98 % -- --   01/20/21 0850 -- -- -- 76 -- 96 % -- --   01/20/21 0845 155/87 -- -- 68 -- 96 % -- --   01/20/21 0840 -- -- -- 64 -- 97 % -- --   01/20/21 0830 148/80 -- -- -- -- -- -- --   01/20/21 0815 144/76 -- -- -- -- -- -- --   01/20/21 0800 (!) 175/95 -- -- (!) 55 -- 96 % -- --   01/20/21 0756 173/86 -- -- (!) 55 -- 97 % -- --   01/20/21 0729 -- -- -- (!) 54 -- 94 % -- --   01/20/21 0728 -- -- -- (!) 56 -- 96 % -- --   01/20/21 0727 -- -- -- (!) 55 -- 97 % -- --   01/20/21 0726 -- -- -- (!) 55 -- 98 % -- --   01/20/21 0725 -- -- -- (!) 58 -- 99 % -- --   01/20/21 0724 -- -- -- 60 -- 99 % -- --   01/20/21 0723 (!) 171/95 -- -- -- -- --  "-- --   01/20/21 0657 (!) 186/117 97.3  F (36.3  C) Temporal 80 20 97 % 5' 6\" (1.676 m) 154 lb (69.9 kg)       PHYSICAL EXAM    General: Young female, appears uncomfortable.  HEENT: No external signs of head trauma. Oropharynx clear and moist.  Chest/Pulm: Lungs clear to auscultation bilaterally. No respiratory distress. Breathing comfortably on room air.   CV: Regular rate and rhythm without murmurs. Warm and well perfused.   Abdomen: Soft and non-distended. RUQ tender to palpation with voluntary guarding. No peritoneal signs.   MSK/Extremities: Actively moving all four extremities. No pedal edema.  Skin: No visible rashes. Not diaphoretic.   Neuro: Alert and conversant. GCS 15.  Psych: Behavior normal.    LAB:  All pertinent labs reviewed and interpreted.  Results for orders placed or performed during the hospital encounter of 01/20/21   Urinalysis-UC if Indicated for patients > 12 years   Result Value Ref Range    Color, UA Yellow Colorless, Yellow, Straw, Light Yellow    Clarity, UA Clear Clear    Glucose, UA Negative Negative    Bilirubin, UA Negative Negative    Ketones, UA Negative Negative, 60 mg/dL    Specific Gravity, UA 1.013 1.001 - 1.030    Blood, UA Negative Negative    pH, UA 7.0 4.5 - 8.0    Protein, UA Negative Negative mg/dL    Urobilinogen, UA <2.0 E.U./dL <2.0 E.U./dL, 2.0 E.U./dL    Nitrite, UA Negative Negative    Leukocytes, UA Negative Negative   Lipase   Result Value Ref Range    Lipase 40 0 - 52 U/L   HM2(CBC w/o Differential)   Result Value Ref Range    WBC 10.3 4.0 - 11.0 thou/uL    RBC 4.08 3.80 - 5.40 mill/uL    Hemoglobin 12.6 12.0 - 16.0 g/dL    Hematocrit 38.7 35.0 - 47.0 %    MCV 95 80 - 100 fL    MCH 30.9 27.0 - 34.0 pg    MCHC 32.6 32.0 - 36.0 g/dL    RDW 13.3 11.0 - 14.5 %    Platelets 284 140 - 440 thou/uL    MPV 10.8 8.5 - 12.5 fL   Basic Metabolic Panel   Result Value Ref Range    Sodium 139 136 - 145 mmol/L    Potassium 3.8 3.5 - 5.0 mmol/L    Chloride 107 98 - 107 mmol/L    " CO2 24 22 - 31 mmol/L    Anion Gap, Calculation 8 5 - 18 mmol/L    Glucose 93 70 - 125 mg/dL    Calcium 9.7 8.5 - 10.5 mg/dL    BUN 14 8 - 22 mg/dL    Creatinine 0.70 0.60 - 1.10 mg/dL    GFR MDRD Af Amer >60 >60 mL/min/1.73m2    GFR MDRD Non Af Amer >60 >60 mL/min/1.73m2   Hepatic Profile   Result Value Ref Range    Bilirubin, Total 0.3 0.0 - 1.0 mg/dL    Bilirubin, Direct 0.1 <=0.5 mg/dL    Protein, Total 7.5 6.0 - 8.0 g/dL    Albumin 4.2 3.5 - 5.0 g/dL    Alkaline Phosphatase 92 45 - 120 U/L    AST 16 0 - 40 U/L    ALT 11 0 - 45 U/L   Asymptomatic SARS-CoV-2 (COVID-19)-PCR    Specimen: Respiratory   Result Value Ref Range    SARS-CoV-2 PCR Result Positive (!!) Negative, Invalid   POCT pregnancy, urine   Result Value Ref Range    POC Preg, Urine Negative Negative    POCT Kit Lot Number [id number]     POCT Kit Expiration Date 2022-04-30     Pos Control Valid Control Valid Control    Neg Control Valid Control Valid Control       RADIOLOGY:  Reviewed all pertinent imaging. Please see official radiology report.  Us Abdomen Limited    Result Date: 1/20/2021  EXAM: US ABDOMEN LIMITED LOCATION: [facility] DATE/TIME: 1/20/2021 7:59 AM INDICATION: Right upper quadrant abdominal pain. COMPARISON: None. TECHNIQUE: Limited abdominal ultrasound. FINDINGS: GALLBLADDER: The gallbladder is mildly distended with multiple small gallstones and a small amount of gallbladder sludge are present. No wall thickening nor pericholecystic fluid. BILE DUCTS: No biliary dilatation. The common duct measures 8 mm. LIVER: Normal parenchyma with smooth contour. No focal mass. RIGHT KIDNEY: No hydronephrosis. PANCREAS: The visualized portions are normal. No ascites.     1.  Cholelithiasis with mildly distended gallbladder. No bile duct dilatation.          Garret SIDDIQUI, am serving as a scribe to document services personally performed by Dr. Montoya based on my observation and the provider's statements to me. Nery SIDDIQUI MD  attest that Nae Zavala is acting in a scribe capacity, has observed my performance of the services and has documented them in accordance with my direction.    Nery Montoya M.D.  Emergency Medicine  [facility] EMERGENCY DEPARTMENT  [address]  [facility] [address]  Dept: [phone]  Loc: [phone]       Nery Montoya MD  01/20/21 0949

## 2021-07-15 NOTE — PROGRESS NOTES
Pt presents to OB triage with left sided abdominal pain. Pain started about 24 hours ago when she was driving.  She was able to sleep through the night last night but pain continues today.  Cramping pain in right side. Only ate toast and watermelon and vomited x 1 so she decided to come in.  No fevers or chills.  No contractions or bleeding.  Had 1 BM yesterday taht was small and usually has about 1 BM a week. No hx kidney stones.     O:   20 1446   98.2 (36.8)   96   18   130/82   98         FHT: Cat I  Norma: Acontractile on arrival with irritability after medication. Not palpable on exam     Abd: Soft throughout.  No uterine tenderness. No fundal tenderness. Point, reproducible tenderness on left side over palpable area of stool   MSK: No intercostal tenderness, No CVA tenderness  SVE: C/L/H per RN    Results for orders placed or performed during the hospital encounter of 20   Urinalysis-UC if Indicated   Result Value Ref Range    Color, UA Yellow Colorless, Yellow, Straw, Light Yellow    Clarity, UA Clear Clear    Glucose, UA Negative Negative    Bilirubin, UA Negative Negative    Ketones, UA Negative Negative, 60 mg/dL    Specific Gravity, UA 1.002 1.001 - 1.030    Blood, UA Negative Negative    pH, UA 7.0 4.5 - 8.0    Protein, UA Negative Negative mg/dL    Urobilinogen, UA <2.0 E.U./dL <2.0 E.U./dL, 2.0 E.U./dL    Nitrite, UA Negative Negative    Leukocytes, UA Negative Negative     33yo  at 33+4 with LLQ pain  - Pain and UA not consistent with nephrolithiasis  - Cervix is not closed and pain is not consistent with labor pain   - I suspect fecal impaction based on exam  - Pt given fleets enema with some relief   - Will use mag sulfate tomorrow  - Instructed to hydrate well   - Can start colace two times a day    Chantelle Coombs MD

## 2021-09-25 ENCOUNTER — HEALTH MAINTENANCE LETTER (OUTPATIENT)
Age: 36
End: 2021-09-25

## 2022-04-02 ENCOUNTER — NURSE TRIAGE (OUTPATIENT)
Dept: NURSING | Facility: CLINIC | Age: 37
End: 2022-04-02
Payer: COMMERCIAL

## 2022-04-03 NOTE — TELEPHONE ENCOUNTER
"States she is 21 wks pregnant. Vomiting today starting at 1 pm. Last vomited at 9:30pm (30 min ago) after eating chicken noodle soup. Vomited 3x today. Also loose stools. No blood in emesis or stools. No fever. Urine output 1 hr ago. No severe dizziness or syncope. Advised home care.     Reason for Disposition    MILD or MODERATE vomiting (e.g., 1 - 5 times / day)    Additional Information    Negative: Shock suspected (e.g., cold/pale/clammy skin, too weak to stand, low BP, rapid pulse)    Negative: Difficult to awaken or acting confused (e.g., disoriented, slurred speech)    Negative: Unable to walk, or can only walk with assistance (e.g., requires support)    Negative: Fainted    Negative: [1] Vomited blood AND [2] large amount (example: \"a cup of blood\")    Negative: Rectal bleeding (i.e., bloody stool, blood in stool)    Negative: Sounds like a life-threatening emergency to the triager    Negative: Coughing up blood  (i.e., from lungs)    Negative: Weak, dizzy or lightheaded    Negative: [1] Vomited blood AND [2] more than a few streaks of blood    (Exception: few streaks and only occurred once)    (Exception: swallowed blood from a nosebleed or cut in the mouth)    Negative: [1] Vomiting AND [2] contains black (\"coffee ground\") material    Negative: Tarry or jet black-colored stool    Negative: [1] Constant abdominal pain AND [2] present > 2 hours    Negative: Recent abdominal injury (within last 3 days)    Negative: Jaundice (yellow eyes) or known cirrhosis of the liver (or history of liver failure or ascites)    Negative: Taking Coumadin (warfarin) or other strong blood thinner, or known bleeding disorder (e.g., thrombocytopenia)    Negative: Pale skin (pallor) of new onset or worsening    Negative: [1] Drinking very little AND [2] dehydration suspected (e.g., no urine > 12 hours, very dry mouth, very lightheaded)    Negative: Age > 60 years    Negative: High-risk adult (e.g., diabetes mellitus, brain tumor, " "V-P shunt, inguinal hernia, chemotherapy)    Negative: Alcohol abuse    Negative: Patient sounds very sick or weak to the triager    Negative: [1] Vomiting AND [2] abdomen looks much more swollen than usual    Negative: Taking any of the following medications: digoxin (Lanoxin), lithium, theophylline, phenytoin (Dilantin)    Negative: Vomiting (without blood) persists > 48 hours    Negative: Vomiting a prescription medication    Negative: [1] Few streaks of blood in vomit AND [2] occurred one time    Negative: Shock suspected (e.g., cold/pale/clammy skin, too weak to stand, low BP, rapid pulse)    Negative: Difficult to awaken or acting confused (e.g., disoriented, slurred speech)    Negative: Sounds like a life-threatening emergency to the triager    Negative: Vomiting occurs only while coughing    Negative: [1] Pregnant < 20 Weeks AND [2] nausea/vomiting began in early pregnancy (i.e., 4-8 weeks pregnant)    Negative: Chest pain    Negative: Headache is main symptom    Negative: Vomiting (or Nausea) in a cancer patient who is currently (or recently) receiving chemotherapy or radiation therapy, or cancer patient who has metastatic or end-stage cancer and is receiving palliative care    Negative: [1] Vomiting AND [2] contains red blood or black (\"coffee ground\") material  (Exception: few red streaks in vomit that only happened once)    Negative: Severe pain in one eye    Negative: Recent head injury (within last 3 days)    Negative: Recent abdominal injury (within last 3 days)    Negative: [1] Insulin-dependent diabetes (Type I) AND [2] glucose > 400 mg/dl (22 mmol/l)    Negative: [1] SEVERE vomiting (e.g., 6 or more times/day) AND [2] present > 8 hours    Negative: [1] MODERATE vomiting (e.g., 3 - 5 times/day) AND [2] age > 60    Negative: Severe headache (e.g., excruciating) (Exception: similar to previous migraines)    Negative: High-risk adult (e.g., diabetes mellitus, brain tumor, V-P shunt, hernia)    " Negative: Patient sounds very sick or weak to the triager    Negative: [1] Drinking very little AND [2] dehydration suspected (e.g., no urine > 12 hours, very dry mouth, very lightheaded)    Negative: [1] Vomiting AND [2] abdomen looks much more swollen than usual    Negative: [1] Vomiting AND [2] contains bile (green color)    Negative: [1] Constant abdominal pain AND [2] present > 2 hours    Negative: [1] Fever > 103 F (39.4 C) AND [2] not able to get the fever down using Fever Care Advice    Negative: [1] Fever > 101 F (38.3 C) AND [2] age > 60    Negative: [1] Fever > 100.0 F (37.8 C) AND [2] bedridden (e.g., nursing home patient, CVA, chronic illness, recovering from surgery)    Negative: [1] Fever > 100.0 F (37.8 C) AND [2] weak immune system (e.g., HIV positive, cancer chemo, splenectomy, organ transplant, chronic steroids)    Negative: Taking any of the following medications: digoxin (Lanoxin), lithium, theophylline, phenytoin (Dilantin)    Negative: [1] MILD or MODERATE vomiting AND [2] present > 48 hours (2 days) (Exception: mild vomiting with associated diarrhea)    Negative: Fever present > 3 days (72 hours)    Negative: Vomiting a prescription medication    Negative: [1] MILD vomiting with diarrhea AND [2] present > 5 days    Negative: Alcohol or drug abuse, known or suspected    Negative: Vomiting is a chronic symptom (recurrent or ongoing AND present > 4 weeks)    Negative: [1] SEVERE vomiting (e.g., 6 or more times/day, vomits everything) BUT [2] hydrated    Protocols used: VOMITING-A-AH, VOMITING BLOOD-A-AH

## 2022-07-02 ENCOUNTER — HEALTH MAINTENANCE LETTER (OUTPATIENT)
Age: 37
End: 2022-07-02

## 2022-07-27 DIAGNOSIS — Z01.812 ENCOUNTER FOR PREPROCEDURE SCREENING LABORATORY TESTING FOR COVID-19: Primary | ICD-10-CM

## 2022-07-27 DIAGNOSIS — Z11.52 ENCOUNTER FOR PREPROCEDURE SCREENING LABORATORY TESTING FOR COVID-19: Primary | ICD-10-CM

## 2022-08-09 ENCOUNTER — HOSPITAL ENCOUNTER (OUTPATIENT)
Facility: HOSPITAL | Age: 37
Discharge: HOME | End: 2022-08-09
Attending: STUDENT IN AN ORGANIZED HEALTH CARE EDUCATION/TRAINING PROGRAM | Admitting: STUDENT IN AN ORGANIZED HEALTH CARE EDUCATION/TRAINING PROGRAM
Payer: COMMERCIAL

## 2022-08-09 ENCOUNTER — LAB (OUTPATIENT)
Dept: FAMILY MEDICINE | Facility: CLINIC | Age: 37
End: 2022-08-09
Attending: OBSTETRICS & GYNECOLOGY
Payer: COMMERCIAL

## 2022-08-09 VITALS
DIASTOLIC BLOOD PRESSURE: 72 MMHG | RESPIRATION RATE: 20 BRPM | HEIGHT: 66 IN | BODY MASS INDEX: 31.66 KG/M2 | HEART RATE: 83 BPM | WEIGHT: 197 LBS | SYSTOLIC BLOOD PRESSURE: 122 MMHG | TEMPERATURE: 98.5 F

## 2022-08-09 DIAGNOSIS — Z11.52 ENCOUNTER FOR PREPROCEDURE SCREENING LABORATORY TESTING FOR COVID-19: ICD-10-CM

## 2022-08-09 DIAGNOSIS — Z01.812 ENCOUNTER FOR PREPROCEDURE SCREENING LABORATORY TESTING FOR COVID-19: ICD-10-CM

## 2022-08-09 PROBLEM — Z36.89 ENCOUNTER FOR TRIAGE IN PREGNANT PATIENT: Status: ACTIVE | Noted: 2022-08-09

## 2022-08-09 LAB — SARS-COV-2 RNA RESP QL NAA+PROBE: NEGATIVE

## 2022-08-09 PROCEDURE — G0463 HOSPITAL OUTPT CLINIC VISIT: HCPCS

## 2022-08-09 PROCEDURE — U0003 INFECTIOUS AGENT DETECTION BY NUCLEIC ACID (DNA OR RNA); SEVERE ACUTE RESPIRATORY SYNDROME CORONAVIRUS 2 (SARS-COV-2) (CORONAVIRUS DISEASE [COVID-19]), AMPLIFIED PROBE TECHNIQUE, MAKING USE OF HIGH THROUGHPUT TECHNOLOGIES AS DESCRIBED BY CMS-2020-01-R: HCPCS

## 2022-08-09 PROCEDURE — U0005 INFEC AGEN DETEC AMPLI PROBE: HCPCS

## 2022-08-09 RX ORDER — LIDOCAINE 40 MG/G
CREAM TOPICAL
Status: DISCONTINUED | OUTPATIENT
Start: 2022-08-09 | End: 2022-08-09 | Stop reason: HOSPADM

## 2022-08-09 ASSESSMENT — ACTIVITIES OF DAILY LIVING (ADL): ADLS_ACUITY_SCORE: 31

## 2022-08-09 NOTE — DISCHARGE INSTRUCTIONS
Discharge Instruction for Undelivered Patients      You were seen for: Fetal Assessment  We Consulted: Dr Chen  You had (Test or Medicine):NST     Diet:   Drink 8 to 12 glasses of liquids (milk, juice, water) every day.  You may eat meals and snacks.     Activity:  Call your doctor or nurse midwife if your baby is moving less than usual.     Call your provider if you notice:  Swelling in your face or increased swelling in your hands or legs.  Headaches that are not relieved by Tylenol (acetaminophen).  Changes in your vision (blurring: seeing spots or stars.)  Nausea (sick to your stomach) and vomiting (throwing up).   Weight gain of 5 pounds or more per week.  Heartburn that doesn't go away.  Signs of bladder infection: pain when you urinate (use the toilet), need to go more often and more urgently.  The bag of rudd (rupture of membranes) breaks, or you notice leaking in your underwear.  Bright red blood in your underwear.  Abdominal (lower belly) or stomach pain.  For first baby: Contractions (tightening) less than 5 minutes apart for one hour or more.  Second (plus) baby: Contractions (tightening) less than 10 minutes apart and getting stronger.  *If less than 34 weeks: Contractions (tightening) more than 6 times in one hour.  Increase or change in vaginal discharge (note the color and amount)  Other:     Follow-up:  Scheduled Inductions

## 2022-08-09 NOTE — PROGRESS NOTES
Patient presents to [facility] for evaluation after a fall. Rebecca states that around 1200 today she tripped and fell on her hands and knees on the concrete sidewalk while trying to catch her cat. Her hands, knees and ankles are slightly scraped. She denies hitting her belly during the fall. She then went to the clinic for a routine Covid screen in preparation for her scheduled induction on 8/13. She states she was not feeling the baby move earlier today but feels movement now. Dr. Reynoso notified. Plan is to monitor the baby for 2 more hours, and if Category 1 tracing will discharge home.

## 2022-08-12 ENCOUNTER — ANESTHESIA (OUTPATIENT)
Dept: OBGYN | Facility: HOSPITAL | Age: 37
End: 2022-08-12
Payer: COMMERCIAL

## 2022-08-12 ENCOUNTER — ANESTHESIA EVENT (OUTPATIENT)
Dept: OBGYN | Facility: HOSPITAL | Age: 37
End: 2022-08-12
Payer: COMMERCIAL

## 2022-08-12 ENCOUNTER — MEDICAL CORRESPONDENCE (OUTPATIENT)
Dept: OBGYN | Facility: HOSPITAL | Age: 37
End: 2022-08-12

## 2022-08-12 ENCOUNTER — HOSPITAL ENCOUNTER (INPATIENT)
Facility: HOSPITAL | Age: 37
LOS: 1 days | Discharge: HOME | End: 2022-08-13
Attending: OBSTETRICS & GYNECOLOGY | Admitting: OBSTETRICS & GYNECOLOGY
Payer: COMMERCIAL

## 2022-08-12 DIAGNOSIS — Z3A.40 40 WEEKS GESTATION OF PREGNANCY: Primary | ICD-10-CM

## 2022-08-12 PROBLEM — Z34.90 CURRENTLY PREGNANT: Status: ACTIVE | Noted: 2022-08-12

## 2022-08-12 LAB
ABO/RH(D): NORMAL
ANTIBODY SCREEN: NEGATIVE
HGB BLD-MCNC: 11.8 G/DL (ref 11.7–15.7)
PLATELET # BLD AUTO: 233 10E3/UL (ref 150–450)
SPECIMEN EXPIRATION DATE: NORMAL
T PALLIDUM AB SER QL: NONREACTIVE

## 2022-08-12 PROCEDURE — 722N000001 HC LABOR CARE VAGINAL DELIVERY SINGLE

## 2022-08-12 PROCEDURE — 86850 RBC ANTIBODY SCREEN: CPT | Performed by: OBSTETRICS & GYNECOLOGY

## 2022-08-12 PROCEDURE — 250N000011 HC RX IP 250 OP 636: Performed by: ANESTHESIOLOGY

## 2022-08-12 PROCEDURE — 250N000009 HC RX 250: Performed by: OBSTETRICS & GYNECOLOGY

## 2022-08-12 PROCEDURE — 250N000013 HC RX MED GY IP 250 OP 250 PS 637: Performed by: OBSTETRICS & GYNECOLOGY

## 2022-08-12 PROCEDURE — 00HU33Z INSERTION OF INFUSION DEVICE INTO SPINAL CANAL, PERCUTANEOUS APPROACH: ICD-10-PCS | Performed by: OBSTETRICS & GYNECOLOGY

## 2022-08-12 PROCEDURE — 258N000003 HC RX IP 258 OP 636: Performed by: OBSTETRICS & GYNECOLOGY

## 2022-08-12 PROCEDURE — 85018 HEMOGLOBIN: CPT | Performed by: OBSTETRICS & GYNECOLOGY

## 2022-08-12 PROCEDURE — 370N000003 HC ANESTHESIA WARD SERVICE

## 2022-08-12 PROCEDURE — 86780 TREPONEMA PALLIDUM: CPT | Performed by: OBSTETRICS & GYNECOLOGY

## 2022-08-12 PROCEDURE — 36415 COLL VENOUS BLD VENIPUNCTURE: CPT | Performed by: OBSTETRICS & GYNECOLOGY

## 2022-08-12 PROCEDURE — 250N000009 HC RX 250: Performed by: ANESTHESIOLOGY

## 2022-08-12 PROCEDURE — 120N000001 HC R&B MED SURG/OB

## 2022-08-12 PROCEDURE — 85049 AUTOMATED PLATELET COUNT: CPT | Performed by: OBSTETRICS & GYNECOLOGY

## 2022-08-12 PROCEDURE — 3E0R3BZ INTRODUCTION OF ANESTHETIC AGENT INTO SPINAL CANAL, PERCUTANEOUS APPROACH: ICD-10-PCS | Performed by: OBSTETRICS & GYNECOLOGY

## 2022-08-12 PROCEDURE — 250N000011 HC RX IP 250 OP 636: Performed by: OBSTETRICS & GYNECOLOGY

## 2022-08-12 RX ORDER — ACETAMINOPHEN 325 MG/1
650 TABLET ORAL EVERY 4 HOURS PRN
Status: DISCONTINUED | OUTPATIENT
Start: 2022-08-12 | End: 2022-08-13 | Stop reason: HOSPADM

## 2022-08-12 RX ORDER — MISOPROSTOL 200 UG/1
800 TABLET ORAL
Status: DISCONTINUED | OUTPATIENT
Start: 2022-08-12 | End: 2022-08-12 | Stop reason: HOSPADM

## 2022-08-12 RX ORDER — METOCLOPRAMIDE 10 MG/1
10 TABLET ORAL EVERY 6 HOURS PRN
Status: DISCONTINUED | OUTPATIENT
Start: 2022-08-12 | End: 2022-08-12 | Stop reason: HOSPADM

## 2022-08-12 RX ORDER — PROCHLORPERAZINE MALEATE 10 MG
10 TABLET ORAL EVERY 6 HOURS PRN
Status: DISCONTINUED | OUTPATIENT
Start: 2022-08-12 | End: 2022-08-12 | Stop reason: HOSPADM

## 2022-08-12 RX ORDER — OXYTOCIN/0.9 % SODIUM CHLORIDE 30/500 ML
4-24 PLASTIC BAG, INJECTION (ML) INTRAVENOUS CONTINUOUS
Status: DISCONTINUED | OUTPATIENT
Start: 2022-08-12 | End: 2022-08-12 | Stop reason: HOSPADM

## 2022-08-12 RX ORDER — NALOXONE HYDROCHLORIDE 0.4 MG/ML
0.2 INJECTION, SOLUTION INTRAMUSCULAR; INTRAVENOUS; SUBCUTANEOUS
Status: DISCONTINUED | OUTPATIENT
Start: 2022-08-12 | End: 2022-08-12 | Stop reason: HOSPADM

## 2022-08-12 RX ORDER — MISOPROSTOL 200 UG/1
800 TABLET ORAL
Status: DISCONTINUED | OUTPATIENT
Start: 2022-08-12 | End: 2022-08-13 | Stop reason: HOSPADM

## 2022-08-12 RX ORDER — PENICILLIN G 3000000 [IU]/50ML
3 INJECTION, SOLUTION INTRAVENOUS EVERY 4 HOURS
Status: DISCONTINUED | OUTPATIENT
Start: 2022-08-12 | End: 2022-08-12 | Stop reason: HOSPADM

## 2022-08-12 RX ORDER — OXYTOCIN 10 [USP'U]/ML
10 INJECTION, SOLUTION INTRAMUSCULAR; INTRAVENOUS
Status: DISCONTINUED | OUTPATIENT
Start: 2022-08-12 | End: 2022-08-13 | Stop reason: HOSPADM

## 2022-08-12 RX ORDER — METHYLERGONOVINE MALEATE 0.2 MG/ML
200 INJECTION INTRAVENOUS
Status: DISCONTINUED | OUTPATIENT
Start: 2022-08-12 | End: 2022-08-12 | Stop reason: HOSPADM

## 2022-08-12 RX ORDER — ONDANSETRON 4 MG/1
4 TABLET, ORALLY DISINTEGRATING ORAL EVERY 6 HOURS PRN
Status: DISCONTINUED | OUTPATIENT
Start: 2022-08-12 | End: 2022-08-12 | Stop reason: HOSPADM

## 2022-08-12 RX ORDER — PROCHLORPERAZINE 25 MG
25 SUPPOSITORY, RECTAL RECTAL EVERY 12 HOURS PRN
Status: DISCONTINUED | OUTPATIENT
Start: 2022-08-12 | End: 2022-08-12 | Stop reason: HOSPADM

## 2022-08-12 RX ORDER — KETOROLAC TROMETHAMINE 30 MG/ML
30 INJECTION, SOLUTION INTRAMUSCULAR; INTRAVENOUS
Status: DISCONTINUED | OUTPATIENT
Start: 2022-08-12 | End: 2022-08-13 | Stop reason: HOSPADM

## 2022-08-12 RX ORDER — IBUPROFEN 800 MG/1
800 TABLET, FILM COATED ORAL EVERY 6 HOURS PRN
Status: DISCONTINUED | OUTPATIENT
Start: 2022-08-12 | End: 2022-08-13 | Stop reason: HOSPADM

## 2022-08-12 RX ORDER — OXYTOCIN/0.9 % SODIUM CHLORIDE 30/500 ML
340 PLASTIC BAG, INJECTION (ML) INTRAVENOUS CONTINUOUS PRN
Status: DISCONTINUED | OUTPATIENT
Start: 2022-08-12 | End: 2022-08-12 | Stop reason: HOSPADM

## 2022-08-12 RX ORDER — CARBOPROST TROMETHAMINE 250 UG/ML
250 INJECTION, SOLUTION INTRAMUSCULAR
Status: DISCONTINUED | OUTPATIENT
Start: 2022-08-12 | End: 2022-08-13 | Stop reason: HOSPADM

## 2022-08-12 RX ORDER — OXYTOCIN 10 [USP'U]/ML
10 INJECTION, SOLUTION INTRAMUSCULAR; INTRAVENOUS
Status: DISCONTINUED | OUTPATIENT
Start: 2022-08-12 | End: 2022-08-12 | Stop reason: HOSPADM

## 2022-08-12 RX ORDER — EPHEDRINE SULFATE 50 MG/ML
5 INJECTION, SOLUTION INTRAMUSCULAR; INTRAVENOUS; SUBCUTANEOUS
Status: DISCONTINUED | OUTPATIENT
Start: 2022-08-12 | End: 2022-08-12 | Stop reason: HOSPADM

## 2022-08-12 RX ORDER — CARBOPROST TROMETHAMINE 250 UG/ML
250 INJECTION, SOLUTION INTRAMUSCULAR
Status: DISCONTINUED | OUTPATIENT
Start: 2022-08-12 | End: 2022-08-12 | Stop reason: HOSPADM

## 2022-08-12 RX ORDER — ONDANSETRON 2 MG/ML
4 INJECTION INTRAMUSCULAR; INTRAVENOUS EVERY 6 HOURS PRN
Status: DISCONTINUED | OUTPATIENT
Start: 2022-08-12 | End: 2022-08-12 | Stop reason: HOSPADM

## 2022-08-12 RX ORDER — PRENATAL VIT/IRON FUM/FOLIC AC 27MG-0.8MG
1 TABLET ORAL DAILY
COMMUNITY

## 2022-08-12 RX ORDER — MISOPROSTOL 200 UG/1
400 TABLET ORAL
Status: DISCONTINUED | OUTPATIENT
Start: 2022-08-12 | End: 2022-08-13 | Stop reason: HOSPADM

## 2022-08-12 RX ORDER — SODIUM CHLORIDE, SODIUM LACTATE, POTASSIUM CHLORIDE, CALCIUM CHLORIDE 600; 310; 30; 20 MG/100ML; MG/100ML; MG/100ML; MG/100ML
INJECTION, SOLUTION INTRAVENOUS CONTINUOUS PRN
Status: DISCONTINUED | OUTPATIENT
Start: 2022-08-12 | End: 2022-08-12 | Stop reason: HOSPADM

## 2022-08-12 RX ORDER — MODIFIED LANOLIN
OINTMENT (GRAM) TOPICAL
Status: DISCONTINUED | OUTPATIENT
Start: 2022-08-12 | End: 2022-08-13 | Stop reason: HOSPADM

## 2022-08-12 RX ORDER — METHYLERGONOVINE MALEATE 0.2 MG/ML
200 INJECTION INTRAVENOUS
Status: DISCONTINUED | OUTPATIENT
Start: 2022-08-12 | End: 2022-08-13 | Stop reason: HOSPADM

## 2022-08-12 RX ORDER — CITRIC ACID/SODIUM CITRATE 334-500MG
30 SOLUTION, ORAL ORAL
Status: DISCONTINUED | OUTPATIENT
Start: 2022-08-12 | End: 2022-08-12 | Stop reason: HOSPADM

## 2022-08-12 RX ORDER — HYDROCORTISONE 25 MG/G
CREAM TOPICAL 3 TIMES DAILY PRN
Status: DISCONTINUED | OUTPATIENT
Start: 2022-08-12 | End: 2022-08-13 | Stop reason: HOSPADM

## 2022-08-12 RX ORDER — NALOXONE HYDROCHLORIDE 0.4 MG/ML
0.4 INJECTION, SOLUTION INTRAMUSCULAR; INTRAVENOUS; SUBCUTANEOUS
Status: DISCONTINUED | OUTPATIENT
Start: 2022-08-12 | End: 2022-08-12 | Stop reason: HOSPADM

## 2022-08-12 RX ORDER — NALBUPHINE HYDROCHLORIDE 10 MG/ML
2.5-5 INJECTION, SOLUTION INTRAMUSCULAR; INTRAVENOUS; SUBCUTANEOUS EVERY 6 HOURS PRN
Status: DISCONTINUED | OUTPATIENT
Start: 2022-08-12 | End: 2022-08-13 | Stop reason: HOSPADM

## 2022-08-12 RX ORDER — DOCUSATE SODIUM 100 MG/1
100 CAPSULE, LIQUID FILLED ORAL DAILY
Status: DISCONTINUED | OUTPATIENT
Start: 2022-08-12 | End: 2022-08-13 | Stop reason: HOSPADM

## 2022-08-12 RX ORDER — IBUPROFEN 800 MG/1
800 TABLET, FILM COATED ORAL
Status: DISCONTINUED | OUTPATIENT
Start: 2022-08-12 | End: 2022-08-13 | Stop reason: HOSPADM

## 2022-08-12 RX ORDER — OXYTOCIN/0.9 % SODIUM CHLORIDE 30/500 ML
340 PLASTIC BAG, INJECTION (ML) INTRAVENOUS CONTINUOUS PRN
Status: DISCONTINUED | OUTPATIENT
Start: 2022-08-12 | End: 2022-08-13 | Stop reason: HOSPADM

## 2022-08-12 RX ORDER — METOCLOPRAMIDE HYDROCHLORIDE 5 MG/ML
10 INJECTION INTRAMUSCULAR; INTRAVENOUS EVERY 6 HOURS PRN
Status: DISCONTINUED | OUTPATIENT
Start: 2022-08-12 | End: 2022-08-12 | Stop reason: HOSPADM

## 2022-08-12 RX ORDER — BISACODYL 10 MG
10 SUPPOSITORY, RECTAL RECTAL DAILY PRN
Status: DISCONTINUED | OUTPATIENT
Start: 2022-08-12 | End: 2022-08-13 | Stop reason: HOSPADM

## 2022-08-12 RX ORDER — LIDOCAINE 40 MG/G
CREAM TOPICAL
Status: DISCONTINUED | OUTPATIENT
Start: 2022-08-12 | End: 2022-08-12 | Stop reason: HOSPADM

## 2022-08-12 RX ORDER — BUPIVACAINE HYDROCHLORIDE 2.5 MG/ML
INJECTION, SOLUTION EPIDURAL; INFILTRATION; INTRACAUDAL
Status: COMPLETED | OUTPATIENT
Start: 2022-08-12 | End: 2022-08-12

## 2022-08-12 RX ORDER — SODIUM CHLORIDE, SODIUM LACTATE, POTASSIUM CHLORIDE, CALCIUM CHLORIDE 600; 310; 30; 20 MG/100ML; MG/100ML; MG/100ML; MG/100ML
INJECTION, SOLUTION INTRAVENOUS CONTINUOUS
Status: DISCONTINUED | OUTPATIENT
Start: 2022-08-12 | End: 2022-08-12 | Stop reason: HOSPADM

## 2022-08-12 RX ORDER — MISOPROSTOL 200 UG/1
400 TABLET ORAL
Status: DISCONTINUED | OUTPATIENT
Start: 2022-08-12 | End: 2022-08-12 | Stop reason: HOSPADM

## 2022-08-12 RX ORDER — PENICILLIN G POTASSIUM 5000000 [IU]/1
5 INJECTION, POWDER, FOR SOLUTION INTRAMUSCULAR; INTRAVENOUS ONCE
Status: COMPLETED | OUTPATIENT
Start: 2022-08-12 | End: 2022-08-12

## 2022-08-12 RX ORDER — OXYTOCIN/0.9 % SODIUM CHLORIDE 30/500 ML
100-340 PLASTIC BAG, INJECTION (ML) INTRAVENOUS CONTINUOUS PRN
Status: DISCONTINUED | OUTPATIENT
Start: 2022-08-12 | End: 2022-08-13 | Stop reason: HOSPADM

## 2022-08-12 RX ADMIN — FAMOTIDINE 20 MG: 10 INJECTION INTRAVENOUS at 12:02

## 2022-08-12 RX ADMIN — Medication 4 MILLI-UNITS/MIN: at 11:57

## 2022-08-12 RX ADMIN — SODIUM CHLORIDE, POTASSIUM CHLORIDE, SODIUM LACTATE AND CALCIUM CHLORIDE 1000 ML: 600; 310; 30; 20 INJECTION, SOLUTION INTRAVENOUS at 07:51

## 2022-08-12 RX ADMIN — DOCUSATE SODIUM 100 MG: 100 CAPSULE, LIQUID FILLED ORAL at 15:41

## 2022-08-12 RX ADMIN — BUPIVACAINE HYDROCHLORIDE 5 ML: 2.5 INJECTION, SOLUTION EPIDURAL; INFILTRATION; INTRACAUDAL at 08:52

## 2022-08-12 RX ADMIN — PENICILLIN G POTASSIUM 5 MILLION UNITS: 5000000 POWDER, FOR SOLUTION INTRAMUSCULAR; INTRAPLEURAL; INTRATHECAL; INTRAVENOUS at 07:52

## 2022-08-12 RX ADMIN — PENICILLIN G 3 MILLION UNITS: 3000000 INJECTION, SOLUTION INTRAVENOUS at 11:51

## 2022-08-12 RX ADMIN — SODIUM CHLORIDE, POTASSIUM CHLORIDE, SODIUM LACTATE AND CALCIUM CHLORIDE: 600; 310; 30; 20 INJECTION, SOLUTION INTRAVENOUS at 09:07

## 2022-08-12 RX ADMIN — KETOROLAC TROMETHAMINE 30 MG: 30 INJECTION, SOLUTION INTRAMUSCULAR at 13:55

## 2022-08-12 RX ADMIN — ONDANSETRON 4 MG: 2 INJECTION INTRAMUSCULAR; INTRAVENOUS at 11:47

## 2022-08-12 RX ADMIN — Medication 5 MG: at 08:56

## 2022-08-12 RX ADMIN — IBUPROFEN 800 MG: 800 TABLET ORAL at 20:09

## 2022-08-12 RX ADMIN — Medication 10 ML/HR: at 08:40

## 2022-08-12 ASSESSMENT — ACTIVITIES OF DAILY LIVING (ADL)
ADLS_ACUITY_SCORE: 18
ADLS_ACUITY_SCORE: 18
WEAR_GLASSES_OR_BLIND: NO
DOING_ERRANDS_INDEPENDENTLY_DIFFICULTY: NO
WALKING_OR_CLIMBING_STAIRS_DIFFICULTY: NO
DRESSING/BATHING_DIFFICULTY: NO
ADLS_ACUITY_SCORE: 18
CHANGE_IN_FUNCTIONAL_STATUS_SINCE_ONSET_OF_CURRENT_ILLNESS/INJURY: NO
NUMBER_OF_TIMES_PATIENT_HAS_FALLEN_WITHIN_LAST_SIX_MONTHS: 1
ADLS_ACUITY_SCORE: 18
TOILETING_ISSUES: NO
CONCENTRATING,_REMEMBERING_OR_MAKING_DECISIONS_DIFFICULTY: NO
FALL_HISTORY_WITHIN_LAST_SIX_MONTHS: YES
ADLS_ACUITY_SCORE: 18
DIFFICULTY_EATING/SWALLOWING: NO

## 2022-08-12 NOTE — L&D DELIVERY NOTE
Delivery Summary    Rebecca Bobo MRN# [id number]   Age: 36 year old YOB: 1985     VAGINAL DELIVERY NOTE    PRE DELIVERY DIAGNOSIS  1) Intrauterine pregnancy at 40w0d   2) GBS positive  3) SROM      POST DELIVERY DIAGNOSIS  1) Intrauterine pregnancy at 40w0d   2) Delivery of a viable female - [place].  3) Apgars: 8 and 9  4) weight: pending    Delivery Method/Type:       PROVIDER:   Nicole Green MD     ANESTHESIA: Epidural    COMPLICATIONS: None    DESCRIPTION OF PROCEDURE  Rebecca Bobo is a 36 year old  with prenatal care with myself who was admitted at 40w0d for SROM. She was 3 cm on admission, received adequate GBS prophylaxis and then Pitocin. She had a short second stage.  Patient had a .  Delayed cord clamping performed.  No gross fetal defects were observed. Pitocin was administered. Placenta delivered intact with 3 vessel cord. The perineum was then evaluated and noted to be intact. Delivery QBL (mL): 25     Nicole Green MD                   [place], Pending Baby Rebecca [[id number]]    Labor Event Times    Labor onset date: 22 Onset time:  6:00 AM   Dilation complete date: 22 Complete time:  1:03 PM   Start pushing date/time: 2022 1314      Labor Events     labor?: No   steroids: None  Labor Type: Spontaneous, Augmentation  Predominate monitoring during 1st stage: continuous electronic fetal monitoring     Antibiotics received during labor?: Yes  Reason for Antibiotics: GBS  Antibiotics received for GBS: Penicillin     Rupture date/time: 22 0530   Rupture type: Spontaneous rupture of membranes occuring during spontaneous labor or augmentation  Fluid color: Clear  Fluid odor: Normal     Augmentation: Oxytocin  Augmentation date/time: 22 1157   Indications for augmentation: Ineffective Contraction Pattern     Delivery/Placenta Date and Time    Delivery Date: 22 Delivery Time:  1:19 PM   Placenta Date/Time:  8/12/2022  1:21 PM  Oxytocin given at the time of delivery: after delivery of baby  Delivering clinician: Nicole Green MD   Other personnel present at delivery:  Provider Role   Josefina Morton, RN Delivery Nurse   Azalia Hernandez, RN Delivery Nurse         Cord    Cord Complications: None               Stem cell collection?: No       Labor Events and Shoulder Dystocia    Fetal Tracing Prior to Delivery: Category 2  Shoulder dystocia present?: Neg     Delivery (Maternal) (Provider to Complete) (574131)    Episiotomy: None  Perineal lacerations: None    Repair suture: None  Genital tract inspection done: Pos     Blood Loss  Mother: Rebecca Bobo #[id number]   Start of Mother's Information    Delivery Blood Loss  08/12/22 0600 - 08/12/22 1328    None           End of Mother's Information  Mother: Rebecca Bobo #[id number]          Delivery - Provider to Complete (024023)    Delivering clinician: Nicole Green MD  Attempted Delivery Types (Choose all that apply): Spontaneous Vaginal Delivery  Delivery Type (Choose the 1 that will go to the Birth History): Vaginal, Spontaneous                   Other personnel:  Provider Role   Josefina Morton, RN Delivery Nurse   Azalia Hernandez, RN Delivery Nurse                Placenta    Date/Time: 8/12/2022  1:21 PM  Removal: Spontaneous  Disposition: Hospital disposal           Anesthesia    Method: Epidural  Cervical dilation at placement: 4-7                Presentation and Position    Presentation: Vertex                  Nicole Green MD

## 2022-08-12 NOTE — PROGRESS NOTES
"Dr. Green at bedside.  Reviewed EFM.  Plan to start pitocin augmentation with 2nd dose of GBS PCN.  Patient feeling dizzy and \"heavy\" in her upperbody. Phsical assessment WDL.  Epidural assessment WDL.  VSS.  Plan to try IV zofran, IV famotidine, sprite, lavender essential oils, and reassess.   "

## 2022-08-12 NOTE — PLAN OF CARE
Problem: Urinary Retention (Postpartum Vaginal Delivery)  Goal: Effective Urinary Elimination  Outcome: Ongoing, Progressing     Patient will be able to ambulate and empty bladder within 4 hours from delivery

## 2022-08-12 NOTE — PROGRESS NOTES
Dr. Green given telephone update:  SVE 4-5cm.  Patient comfortable with an epidural.  Rebecca had what she would describe as a panic attack after her epidural placement, when she had tachycardia from a dose of ephedrine.  Rebecca reports she's feeling better now. Contractions every 4-5 minutes and palpating moderate.  Category 1 tracing.  Plan to place ramirez, continue with position changes, and continue GBS prophylaxis.  Will consider IV pitocin for augmentation after second dose of PCN.  No new orders at this time.

## 2022-08-12 NOTE — PLAN OF CARE
Problem: Labor Pain (Labor)  Goal: Acceptable Pain Control  Outcome: Ongoing, Progressing     Epidural placed at 0755, patient coping well. Patient will continue to verbalize that she is coping with labor.

## 2022-08-12 NOTE — H&P
[facility]    History and Physical  Obstetrics and Gynecology     Date of Admission:  2022    Assessment & Plan   Rebecca Bobo is a 36 year old  at 40w0d who presents with SROM.    ASSESSMENT:   IUP @ 40w0d in early labor.  FHT  Category  I    PLAN:   Admit - see IP orders  Epidural  GBS prophylaxis  Pitocin when adequate GBS prophylaxis  Anticipate     Joelle Green MD, FACOG, [facility]  2022 11:48 AM          History of Present Illness   Rebecca Bobo is a 36 year old female  at 40w0d  is admitted [facility] the [facility] with SROM at 0530 to clear fluid. Had growth restriction that resolved as pregnancy progressed. Has anxiety, GBS positive status, history of a shoulder dystocia last delivery.    PRENATAL COURSE  Prenatal course was complicated by GBS positive, anxiety, history of shoulder dystocia      Prenatal labs notable for: Rh positive, GBS positive      Past Medical History    I have reviewed this patient's medical history and updated it with pertinent information if needed.   Past Medical History:   Diagnosis Date     ASCUS on Pap smear 3/09    + HR HPV, colpo benign     Heartburn        Past Surgical History   I have reviewed this patient's surgical history and updated it with pertinent information if needed.  Past Surgical History:   Procedure Laterality Date     APPENDECTOMY       LAPAROSCOPIC CHOLECYSTECTOMY N/A 2021    Procedure: CHOLECYSTECTOMY, LAPAROSCOPIC;  Surgeon: Zeina Rodríguez MD;  [place] [facility] [place]  Service: General       Prior to Admission Medications   Prior to Admission Medications   Prescriptions Last Dose Informant Patient Reported? Taking?   Prenatal Vit-Fe Fumarate-FA (PRENATAL MULTIVITAMIN W/IRON) 27-0.8 MG tablet   Yes Yes   Sig: Take 1 tablet by mouth daily      Facility-Administered Medications: None     Allergies   Allergies   Allergen Reactions     Codeine Itching and Rash       Social History   I have  reviewed this patient's social history and updated it with pertinent information if needed. Rebecca Bobo  reports that she has quit smoking. She has never used smokeless tobacco. She reports previous alcohol use. She reports that she does not use drugs.    Family History   I have reviewed this patient's family history and updated it with pertinent information if needed.   Family History   Problem Relation Age of Onset     No Known Problems Mother      No Known Problems Father      No Known Problems Sister      No Known Problems Brother      No Known Problems Son      No Known Problems Brother        Immunization History   TDAP given this pregnancy.      Physical Exam   Temp: 98.3  F (36.8  C) Temp src: Oral BP: 107/70     Resp: 20 SpO2: 100 %      Vital Signs with Ranges  Temp:  [97.8  F (36.6  C)-98.4  F (36.9  C)] 98.3  F (36.8  C)  Resp:  [16-20] 20  BP: ()/(54-92) 107/70  SpO2:  [97 %-100 %] 100 %    Abdomen: gravid, single vertex fetus, non-tender, EFW 7 lbs 12  Cervical Exam: 4 cm    Fetal Heart Tones: normal baseline, moderate variablility, + accels, no decels and Category I  TOCO:   frequency q 5 minutes    Constitutional: healthy, alert  Respiratory: No increased work of breathing, good air exchange.  Cardiovascular: RRR

## 2022-08-12 NOTE — ANESTHESIA PREPROCEDURE EVALUATION
Anesthesia Pre-Procedure Evaluation    Patient: Rebecca Bobo   MRN: [id number] : 1985        Procedure : * No procedures listed *          Past Medical History:   Diagnosis Date     ASCUS on Pap smear 3/09    + HR HPV, colpo benign     Heartburn       Past Surgical History:   Procedure Laterality Date     APPENDECTOMY       LAPAROSCOPIC CHOLECYSTECTOMY N/A 2021    Procedure: CHOLECYSTECTOMY, LAPAROSCOPIC;  Surgeon: Zeina Rodríguez MD;  Location: [facility];  Service: General      Allergies   Allergen Reactions     Codeine Itching and Rash      Social History     Tobacco Use     Smoking status: Former Smoker     Smokeless tobacco: Never Used     Tobacco comment: Smoked three years and stopped 2 years ago.   Substance Use Topics     Alcohol use: Not Currently     Comment: Alcoholic Drinks/day: Works as a , may drink after work.      Wt Readings from Last 1 Encounters:   22 89.4 kg (197 lb)        Anesthesia Evaluation   Pt has had prior anesthetic. Type: General.    No history of anesthetic complications       ROS/MED HX  ENT/Pulmonary:  - neg pulmonary ROS     Neurologic:  - neg neurologic ROS     Cardiovascular:  - neg cardiovascular ROS     METS/Exercise Tolerance:     Hematologic:  - neg hematologic  ROS     Musculoskeletal:       GI/Hepatic:  - neg GI/hepatic ROS     Renal/Genitourinary:       Endo:  - neg endo ROS   (+) Obesity (BMI 31.8),     Psychiatric/Substance Use:  - neg psychiatric ROS     Infectious Disease:       Malignancy:       Other:            Physical Exam    Airway        Mallampati: II   TM distance: > 3 FB   Neck ROM: full   Mouth opening: > 3 cm    Respiratory Devices and Support         Dental  no notable dental history         Cardiovascular   cardiovascular exam normal          Pulmonary   pulmonary exam normal                OUTSIDE LABS:  CBC:   Lab Results   Component Value Date    WBC 10.3 2021    WBC 11.6 (H) 2019    HGB 12.6  01/20/2021    HGB 12.6 12/07/2019    HCT 38.7 01/20/2021    HCT 38.6 12/07/2019     01/20/2021     12/07/2019     BMP:   Lab Results   Component Value Date     01/20/2021     12/07/2019    POTASSIUM 3.8 01/20/2021    POTASSIUM 3.9 12/07/2019    CHLORIDE 107 01/20/2021    CHLORIDE 106 12/07/2019    CO2 24 01/20/2021    CO2 24 12/07/2019    BUN 14 01/20/2021    BUN 8 12/07/2019    CR 0.70 01/20/2021    CR 0.76 12/07/2019    GLC 93 01/20/2021    GLC 94 12/07/2019     COAGS: No results found for: PTT, INR, FIBR  POC:   Lab Results   Component Value Date    HCG Negative 01/20/2021    HCGS Negative 02/09/2019     HEPATIC:   Lab Results   Component Value Date    ALBUMIN 4.2 01/20/2021    PROTTOTAL 7.5 01/20/2021    ALT 11 01/20/2021    AST 16 01/20/2021    ALKPHOS 92 01/20/2021    BILITOTAL 0.3 01/20/2021     OTHER:   Lab Results   Component Value Date    MAYRA 9.7 01/20/2021    LIPASE 40 01/20/2021       Anesthesia Plan    ASA Status:  2      Anesthesia Type: Epidural.              Consents    Anesthesia Plan(s) and associated risks, benefits, and realistic alternatives discussed. Questions answered and patient/representative(s) expressed understanding.    - Discussed:     - Discussed with:  Patient         Postoperative Care            Comments:           neg OB ROS.       Jameel Regalado MD

## 2022-08-12 NOTE — PROGRESS NOTES
Rebecca was able to ambulate to restroom, empty her bladder, and tub.  Report given to Carline ORTEGA RN.

## 2022-08-12 NOTE — PROGRESS NOTES
Telephone update given to KATY Winslow:  Rebecca currently comfortable with the epidural.  We treated her x1 with ephedrine per orders for hypotension.  BP normalized.  Patient reported her heart felt like it was racing, HR ranging form 90s-130s.  RNs still at the bedside and reassured patient that the heart racing was a side effect of the ephedrine and should pass. Rebecca is feeling better now.  No new orders from Dr. Regalado.

## 2022-08-12 NOTE — ANESTHESIA PROCEDURE NOTES
Epidural catheter Procedure Note    Pre-Procedure   Staff -        Anesthesiologist:  Jameel Regalado MD       Performed By: anesthesiologist       Location: OB       Procedure Start/Stop Times: 8/12/2022 8:40 AM and 8/12/2022 8:52 AM       Pre-Anesthestic Checklist: patient identified, IV checked, risks and benefits discussed, informed consent, monitors and equipment checked, pre-op evaluation, at physician/surgeon's request and post-op pain management  Timeout:       Correct Patient: Yes        Correct Procedure: Yes        Correct Site: Yes        Correct Position: Yes   Procedure Documentation  Procedure: epidural catheter       Diagnosis: Labor pain       Patient Position: sitting       Patient Prep/Sterile Barriers: sterile gloves, mask       Skin prep: Chloraprep       Local skin infiltrated with 2 mL of 1% lidocaine.        Insertion Site: L2-3. (midline approach).       Technique: LORT saline        SERA at 6 cm.       Needle Type: [employer]       Needle Gauge: 18.        Needle Length (Inches): 3.5        Catheter: 20 G.          Catheter threaded easily.         5 cm epidural space.         Threaded 11 cm at skin.         # of attempts: 1 and  # of redirects:  1    Assessment/Narrative         Paresthesias: No.       Test dose of 5 mL lidocaine 1.5% w/ 1:200,000 epinephrine at 08:44 CDT.         Test dose negative, 3 minutes after injection, for signs of intravascular, subdural, or intrathecal injection.       Insertion/Infusion Method: LORT saline       Aspiration negative for Heme or CSF via Epidural Catheter.    Medication(s) Administered   0.25% Bupivacaine PF (Epidural) - EPIDURAL   5 mL - 8/12/2022 8:52:00 AM  Medication Administration Time: 8/12/2022 8:40 AM

## 2022-08-12 NOTE — PROGRESS NOTES
Ephedrine and fentanyl/bupivicaine epidural medication withdrawn from [facility] by this RN and handed to Lucille Morton RN.

## 2022-08-12 NOTE — PROGRESS NOTES
Data: Patient admitted to room 13 at 0710. Patient is a . Prenatal record reviewed.   OB History    Para Term  AB Living   3 2 2 0 0 2   SAB IAB Ectopic Multiple Live Births   0 0 0 0 2      # Outcome Date GA Lbr Fermin/2nd Weight Sex Delivery Anes PTL Lv   3 Current            2 Term 20 40w2d / 00:16 4.082 kg (9 lb) M Vag-Spont EPI N FILIPE      Complications: Shoulder Dystocia      Name: QUINTEN GUZMAN-ELKIN      Apgar1: 8  Apgar5: 9   1 Term 14    M    FILIPE   .  Medical History:   Past Medical History:   Diagnosis Date     ASCUS on Pap smear 3/09    + HR HPV, colpo benign     Heartburn    .  Gestational age 40w0d. Vital signs per doc flowsheet. Fetal movement present. Patient reports Rule out rupture of membranes (SROM 0530)   as reason for admission. Support persons Herbert Guzman and Kaylah - Patient's mom present.  Action:  Verbal consent for EFM, external fetal monitors applied. Admission assessment completed. Patient and support persons educated on labor process. Patient instructed to report change in fetal movement, contractions, vaginal leaking of fluid or bleeding, abdominal pain, or any concerns related to the pregnancy to her nurse/physician. Patient oriented to room, call light in reach.   Response: Dr. Green informed of patient arrival, SROM, and labor status. Plan per provider is labor, GBS prophylaxis, and epidural when patient is ready. Patient verbalized understanding of education and verbalized agreement with plan. Patient coping with labor via non-pharmacological techniques at this time, prepping for epidural.

## 2022-08-13 ENCOUNTER — MEDICAL CORRESPONDENCE (OUTPATIENT)
Dept: HEALTH INFORMATION MANAGEMENT | Facility: CLINIC | Age: 37
End: 2022-08-13

## 2022-08-13 VITALS
DIASTOLIC BLOOD PRESSURE: 77 MMHG | BODY MASS INDEX: 31.66 KG/M2 | SYSTOLIC BLOOD PRESSURE: 125 MMHG | TEMPERATURE: 97.9 F | HEIGHT: 66 IN | RESPIRATION RATE: 16 BRPM | HEART RATE: 63 BPM | WEIGHT: 197 LBS | OXYGEN SATURATION: 100 %

## 2022-08-13 PROCEDURE — 250N000013 HC RX MED GY IP 250 OP 250 PS 637: Performed by: OBSTETRICS & GYNECOLOGY

## 2022-08-13 RX ADMIN — ACETAMINOPHEN 650 MG: 325 TABLET ORAL at 08:56

## 2022-08-13 RX ADMIN — ACETAMINOPHEN 650 MG: 325 TABLET ORAL at 00:19

## 2022-08-13 RX ADMIN — ACETAMINOPHEN 650 MG: 325 TABLET ORAL at 04:38

## 2022-08-13 RX ADMIN — IBUPROFEN 800 MG: 800 TABLET ORAL at 01:55

## 2022-08-13 RX ADMIN — IBUPROFEN 800 MG: 800 TABLET ORAL at 07:30

## 2022-08-13 RX ADMIN — DOCUSATE SODIUM 100 MG: 100 CAPSULE, LIQUID FILLED ORAL at 08:56

## 2022-08-13 ASSESSMENT — ACTIVITIES OF DAILY LIVING (ADL)
ADLS_ACUITY_SCORE: 18

## 2022-08-13 NOTE — PLAN OF CARE
Patient is managing pain with Ibuprofen and Tylenol.   Postpartum assessment WNL.   Patient is hopeful for discharge to home today.  Tdap up to date.   Still needs to complete mood assessment and birth certificate.     Problem: Bleeding (Postpartum Vaginal Delivery)  Goal: Hemostasis  Outcome: Ongoing, Progressing     Problem: Infection (Postpartum Vaginal Delivery)  Goal: Absence of Infection Signs/Symptoms  Outcome: Ongoing, Progressing     Problem: Pain (Postpartum Vaginal Delivery)  Goal: Acceptable Pain Control  Outcome: Ongoing, Progressing  Intervention: Prevent or Manage Pain  Recent Flowsheet Documentation  Taken 8/13/2022 0019 by Eliana Dodson, RN  Pain Management Interventions:   medication (see MAR)   repositioned   rest     Problem: Adjustment to Role Transition (Postpartum Vaginal Delivery)  Goal: Successful Maternal Role Transition  Outcome: Met     Problem: Urinary Retention (Postpartum Vaginal Delivery)  Goal: Effective Urinary Elimination  Outcome: Met

## 2022-08-13 NOTE — ANESTHESIA POSTPROCEDURE EVALUATION
Patient: Rebecca Bobo    Procedure: * No procedures listed *       Anesthesia Type:  Epidural    Note:  Disposition: Inpatient   Postop Pain Control: Uneventful            Sign Out: Well controlled pain   PONV: No   Neuro/Psych: Uneventful            Sign Out: Acceptable/Baseline neuro status   Airway/Respiratory: Uneventful            Sign Out: Acceptable/Baseline resp. status   CV/Hemodynamics: Uneventful            Sign Out: Acceptable CV status; No obvious hypovolemia; No obvious fluid overload   Other NRE: NONE   DID A NON-ROUTINE EVENT OCCUR? No    Event details/Postop Comments:  Neuraxial block has worn off, no residual numbness or weakness. Pain controlled. Denies headache or back pain. No further questions or concerns. Instructed that we are available 24/7 should she have questions pertaining to her epidural.             Last vitals:  Vitals:    08/12/22 1851 08/13/22 0000 08/13/22 0730   BP: 117/62 112/53 119/61   Pulse: 78 62 62   Resp: 18 16 16   Temp: 36.7  C (98  F) 36.9  C (98.5  F) 36.7  C (98.1  F)   SpO2:   98%       Electronically Signed By: Jameel Regalado MD  August 13, 2022  8:50 AM

## 2022-08-13 NOTE — DISCHARGE SUMMARY
HOSPITAL DISCHARGE SUMMARY - SURGERY    NAME: Rebecca Bobo  : 1985   MRN: [id number]    PCP: Gayle Britton    ADMISSION DATE: 2022    DISCHARGE DATE: 2022     PRINCIPAL DISCHARGE DIAGNOSIS:   S/P       BRIEF HISTORY OF PRESENT ILLNESS AND HOSPITAL COURSE:  This is a  36 year old female admitted for labor. Patient underwent .  Patient tolerated the procedure well. Post operative course has been unremarkable. On day of discharge, her pain is controlled with current oral medications and is tolerating diet. She is voiding appropriately and is passing gas.     LABS:  Lab Results   Component Value Date    HGB 11.8 2022       PENDING LABS:  Pathology    DISPOSITION:  home    DISCHARGE CONDITION: Good/Stable    DISCHARGE PLAN:   - Follow up with  [facility] in 6wks  - Take medication as prescribed  - Physical activity: As tolerated, no heavy lifting. Pelvic rest.  - Diet:  Regular  - Medication:  Please see MAR  - Warning signs discussed with patient about when to call the clinic/hospital  - All questions and concerns were answered for the patient prior to discharge.         Coretta Cherry DO, FACOG  2022 8:43 AM        I saw the patient on the date of discharge              ?

## 2022-09-12 ENCOUNTER — MEDICAL CORRESPONDENCE (OUTPATIENT)
Dept: HEALTH INFORMATION MANAGEMENT | Facility: CLINIC | Age: 37
End: 2022-09-12

## 2022-10-12 ENCOUNTER — MEDICAL CORRESPONDENCE (OUTPATIENT)
Dept: HEALTH INFORMATION MANAGEMENT | Facility: CLINIC | Age: 37
End: 2022-10-12

## 2023-04-20 ENCOUNTER — MEDICAL CORRESPONDENCE (OUTPATIENT)
Dept: HEALTH INFORMATION MANAGEMENT | Facility: CLINIC | Age: 38
End: 2023-04-20
Payer: COMMERCIAL

## 2023-04-22 ENCOUNTER — HEALTH MAINTENANCE LETTER (OUTPATIENT)
Age: 38
End: 2023-04-22

## 2023-05-21 ENCOUNTER — OFFICE VISIT (OUTPATIENT)
Dept: FAMILY MEDICINE | Facility: CLINIC | Age: 38
End: 2023-05-21
Payer: COMMERCIAL

## 2023-05-21 VITALS
SYSTOLIC BLOOD PRESSURE: 148 MMHG | DIASTOLIC BLOOD PRESSURE: 99 MMHG | RESPIRATION RATE: 24 BRPM | OXYGEN SATURATION: 100 % | HEART RATE: 97 BPM | TEMPERATURE: 98.4 F

## 2023-05-21 DIAGNOSIS — F32.A DEPRESSION, UNSPECIFIED DEPRESSION TYPE: Primary | ICD-10-CM

## 2023-05-21 DIAGNOSIS — F41.9 ANXIETY: ICD-10-CM

## 2023-05-21 PROCEDURE — 99214 OFFICE O/P EST MOD 30 MIN: CPT | Performed by: PHYSICIAN ASSISTANT

## 2023-05-21 RX ORDER — HYDROXYZINE HYDROCHLORIDE 25 MG/1
25 TABLET, FILM COATED ORAL 3 TIMES DAILY PRN
Qty: 40 TABLET | Refills: 0 | Status: SHIPPED | OUTPATIENT
Start: 2023-05-21

## 2023-05-21 RX ORDER — SERTRALINE HYDROCHLORIDE 25 MG/1
TABLET, FILM COATED ORAL
Qty: 67 TABLET | Refills: 0 | Status: SHIPPED | OUTPATIENT
Start: 2023-05-21 | End: 2023-07-27

## 2023-05-21 NOTE — PROGRESS NOTES
Patient presents with:  Anxiety      Clinical Decision Making:  Patient experiencing symptoms of anxiety and depression.  She is interested in medication management.  Given her lactating status I recommend starting with SSRI sertraline.  I did explain blackbox warning to this patient.  She is not currently suicidal or homicidal.  She was instructed to seek emergency medical attention if mood worsened and thoughts of suicide occurred.  Patient is also prescribed hydroxyzine for as needed panic attack relief.  I did explain to the patient that hydroxyzine may decrease milk supply, so she should use it sparingly if she wants to continue to breast-feed.  Patient is trying to wean off of breast-feeding.        ICD-10-CM    1. Depression, unspecified depression type  F32.A Adult Mental Health  Referral     sertraline (ZOLOFT) 25 MG tablet      2. Anxiety  F41.9 Adult Mental Health  Referral     hydrOXYzine (ATARAX) 25 MG tablet     sertraline (ZOLOFT) 25 MG tablet          Patient Instructions   1. Begin taking Sertraline.   2. May take Hydroxyzine as needed for panic attacks.   3. Follow up with mental health professional and a primary care provider.   4. Seek emergency medical attention if you develop any worsening thought of harming yourself or others.   5. I recommend a Melatonin to help with sleep.       HPI:  Rebecca Bobo is a 37 year old female who presents today complaining of anxiety and depression symptoms.  Patient suffered from some anxiety symptoms while she was pregnant with her now 9-month-old baby.  She was never medicated, but she was having frequent checkups with her OB/GYN.  She does not have any past medical history of diagnosed depression or anxiety and has never been medicated.  Her symptoms feel like agitation, shakiness, palpitations, and rage.  At times she has taken her anger out on inanimate objects.  She has never harmed any human or animal.  She is dealing with current  stressors of breast-feeding her 9-month-old, caring for her older toddler, and going through a current divorce.  She is having difficulty sleeping due to her anxiety.  Today she is interested in medication treatment for her conditions.  I also offered following up with mental health professional and she is very interested to do so.  Patient has had thoughts of harming herself, but has never acted on it and does not think she would ever commit suicide.  She has no current weapon or plan to do so.  Patient cries frequently and struggles to handle control her emotions when she is sad.    History obtained from the patient.    Problem List:  2022-08: Currently pregnant  2022-08: Encounter for triage in pregnant patient  2009-05: Wrist injury  2009-04: Abnormal Pap smear of cervix      Past Medical History:   Diagnosis Date     ASCUS on Pap smear 3/09    + HR HPV, colpo benign     Heartburn        Social History     Tobacco Use     Smoking status: Former     Smokeless tobacco: Never     Tobacco comments:     Smoked three years and stopped 2 years ago.   Vaping Use     Vaping status: Not on file   Substance Use Topics     Alcohol use: Not Currently     Comment: Alcoholic Drinks/day: Works as a , may drink after work.       Review of Systems    Vitals:    05/21/23 1229   BP: (!) 148/99   Pulse: 97   Resp: 24   Temp: 98.4  F (36.9  C)   TempSrc: Oral   SpO2: 100%       Physical Exam  Vitals and nursing note reviewed.   Constitutional:       General: She is not in acute distress.     Appearance: She is not toxic-appearing or diaphoretic.   HENT:      Head: Normocephalic and atraumatic.      Right Ear: External ear normal.      Left Ear: External ear normal.   Eyes:      Conjunctiva/sclera: Conjunctivae normal.   Pulmonary:      Effort: Pulmonary effort is normal. No respiratory distress.   Neurological:      Mental Status: She is alert.   Psychiatric:         Attention and Perception: Attention normal.         Mood and  Affect: Affect is tearful. Affect is not blunt, flat or angry.         Speech: Speech normal. Speech is not rapid and pressured, delayed or tangential.         Behavior: Behavior normal. Behavior is not aggressive or withdrawn. Behavior is cooperative.         Thought Content: Thought content normal. Thought content is not paranoid or delusional. Thought content does not include homicidal or suicidal ideation. Thought content does not include homicidal or suicidal plan.         Cognition and Memory: Cognition normal.         Judgment: Judgment normal.       At the end of the encounter, I discussed results, diagnosis, medications. Discussed red flags for immediate return to clinic/ER, as well as indications for follow up if no improvement. Patient understood and agreed to plan. Patient was stable for discharge.

## 2023-05-21 NOTE — PATIENT INSTRUCTIONS
Begin taking Sertraline.   May take Hydroxyzine as needed for panic attacks.   Follow up with mental health professional and a primary care provider.   Seek emergency medical attention if you develop any worsening thought of harming yourself or others.   I recommend a Melatonin to help with sleep.

## 2023-07-15 ENCOUNTER — HEALTH MAINTENANCE LETTER (OUTPATIENT)
Age: 38
End: 2023-07-15

## 2024-07-24 ENCOUNTER — E-VISIT (OUTPATIENT)
Dept: URGENT CARE | Facility: CLINIC | Age: 39
End: 2024-07-24
Payer: COMMERCIAL

## 2024-07-24 DIAGNOSIS — N39.0 ACUTE UTI (URINARY TRACT INFECTION): Primary | ICD-10-CM

## 2024-07-24 PROCEDURE — 99207 PR NON-BILLABLE SERV PER CHARTING: CPT | Performed by: PHYSICIAN ASSISTANT

## 2024-07-24 RX ORDER — NITROFURANTOIN 25; 75 MG/1; MG/1
100 CAPSULE ORAL 2 TIMES DAILY
Qty: 10 CAPSULE | Refills: 0 | Status: SHIPPED | OUTPATIENT
Start: 2024-07-24 | End: 2024-07-29

## 2024-07-24 NOTE — PATIENT INSTRUCTIONS
Dear Rebecca Bobo    After reviewing your responses, I've been able to diagnose you with a urinary tract infection, which is a common infection of the bladder with bacteria.  This is not a sexually transmitted infection, though urinating immediately after intercourse can help prevent infections.  Drinking lots of fluids is also helpful to clear your current infection and prevent the next one.      I have sent a prescription for antibiotics to your pharmacy to treat this infection.    It is important that you take all of your prescribed medication even if your symptoms are improving after a few doses.  Taking all of your medicine helps prevent the symptoms from returning.     If your symptoms worsen, you develop pain in your back or stomach, develop fevers, or are not improving in 5 days, please contact your primary care provider for an appointment or visit any of our convenient Walk-in or Urgent Care Centers to be seen, which can be found on our website here.    Thanks again for choosing us as your health care partner,    James Delatorre PA-C

## 2024-09-07 ENCOUNTER — HEALTH MAINTENANCE LETTER (OUTPATIENT)
Age: 39
End: 2024-09-07

## 2025-04-04 ENCOUNTER — OFFICE VISIT (OUTPATIENT)
Dept: FAMILY MEDICINE | Facility: CLINIC | Age: 40
End: 2025-04-04
Payer: COMMERCIAL

## 2025-04-04 VITALS
TEMPERATURE: 97.7 F | RESPIRATION RATE: 16 BRPM | SYSTOLIC BLOOD PRESSURE: 136 MMHG | HEART RATE: 86 BPM | HEIGHT: 66 IN | BODY MASS INDEX: 30.29 KG/M2 | DIASTOLIC BLOOD PRESSURE: 62 MMHG | WEIGHT: 188.5 LBS | OXYGEN SATURATION: 99 %

## 2025-04-04 DIAGNOSIS — Z41.1 ENCOUNTER FOR BREAST AUGMENTATION: ICD-10-CM

## 2025-04-04 DIAGNOSIS — Z01.818 PREOP GENERAL PHYSICAL EXAM: Primary | ICD-10-CM

## 2025-04-04 LAB
ANION GAP SERPL CALCULATED.3IONS-SCNC: 11 MMOL/L (ref 7–15)
BUN SERPL-MCNC: 9.3 MG/DL (ref 6–20)
CALCIUM SERPL-MCNC: 9.3 MG/DL (ref 8.8–10.4)
CHLORIDE SERPL-SCNC: 105 MMOL/L (ref 98–107)
CREAT SERPL-MCNC: 0.7 MG/DL (ref 0.51–0.95)
EGFRCR SERPLBLD CKD-EPI 2021: >90 ML/MIN/1.73M2
GLUCOSE SERPL-MCNC: 106 MG/DL (ref 70–99)
HCO3 SERPL-SCNC: 23 MMOL/L (ref 22–29)
HGB BLD-MCNC: 12.6 G/DL (ref 11.7–15.7)
POTASSIUM SERPL-SCNC: 4.3 MMOL/L (ref 3.4–5.3)
SODIUM SERPL-SCNC: 139 MMOL/L (ref 135–145)

## 2025-04-04 PROCEDURE — 80048 BASIC METABOLIC PNL TOTAL CA: CPT

## 2025-04-04 PROCEDURE — 85018 HEMOGLOBIN: CPT

## 2025-04-04 PROCEDURE — 99213 OFFICE O/P EST LOW 20 MIN: CPT

## 2025-04-04 PROCEDURE — 36415 COLL VENOUS BLD VENIPUNCTURE: CPT

## 2025-04-04 RX ORDER — AMOXICILLIN 500 MG/1
500 TABLET, FILM COATED ORAL
COMMUNITY
Start: 2025-04-03 | End: 2025-04-13

## 2025-04-04 RX ORDER — CYCLOBENZAPRINE HCL 10 MG
10 TABLET ORAL
COMMUNITY
Start: 2024-05-29 | End: 2025-04-04

## 2025-04-04 ASSESSMENT — PAIN SCALES - GENERAL: PAINLEVEL_OUTOF10: NO PAIN (0)

## 2025-04-04 NOTE — PATIENT INSTRUCTIONS
How to Take Your Medication Before Surgery  Preoperative Medication Instructions   Antiplatelet or Anticoagulation Medication Instructions   - We reviewed the medication list and the patient is not on an antiplatelet or anticoagulation medications.    Additional Medication Instructions   - Herbal medications and vitamins: DO NOT TAKE 14 days prior to surgery.     Do not take any ibuprofen, aspirin, naproxen 7 days prior to surgery.       Patient Education   Preparing for Your Surgery  For Adults  Getting started  In most cases, a nurse will call to review your health history and instructions. They will give you an arrival time based on your scheduled surgery time. Please be ready to share:  Your doctor's clinic name and phone number  Your medical, surgical, and anesthesia history  A list of allergies and sensitivities  A list of medicines, including herbal treatments and over-the-counter drugs  Whether the patient has a legal guardian (ask how to send us the papers in advance)  Note: You may not receive a call if you were seen at our PAC (Preoperative Assessment Center).  Please tell us if you're pregnant--or if there's any chance you might be pregnant. Some surgeries may injure a fetus (unborn baby), so they require a pregnancy test. Surgeries that are safe for a fetus don't always need a test, and you can choose whether to have one.   Preparing for surgery  Within 10 to 30 days of surgery: Have a pre-op exam (sometimes called an H&P, or History and Physical). This can be done at a clinic or pre-operative center.  If you're having a , you may not need this exam. Talk to your care team.  At your pre-op exam, talk to your care team about all medicines you take. (This includes CBD oil and any drugs, such as THC, marijuana, and other forms of cannabis.) If you need to stop any medicine before surgery, ask when to start taking it again.  This is for your safety. Many medicines and drugs can make you bleed too  much during surgery. Some change how well surgery (anesthesia) drugs work.  Call your insurance company to let them know you're having surgery. (If you don't have insurance, call [phone].)  Call your clinic if there's any change in your health. This includes a scrape or scratch near the surgery site, or any signs of a cold (sore throat, runny nose, cough, rash, fever).  Eating and drinking guidelines  For your safety: Unless your surgeon tells you otherwise, follow the guidelines below.  Eat and drink as normal until 8 hours before you arrive for surgery. After that, no food or milk. You can spit out gum when you arrive.  Drink clear liquids until 2 hours before you arrive. These are liquids you can see through, like water, Gatorade, and Propel Water. They also include plain black coffee and tea (no cream or milk).  No alcohol for 24 hours before you arrive. The night before surgery, stop any drinks that contain THC.  If your care team tells you to take medicine on the morning of surgery, it's okay to take it with a sip of water. No other medicines or drugs are allowed (including CBD oil)--follow your care team's instructions.  If you have questions the day of surgery, call your hospital or surgery center.   Preventing infection  Shower or bathe the night before and the morning of surgery. Follow the instructions your clinic gave you. (If no instructions, use regular soap.)  Don't shave or clip hair near your surgery site. We'll remove the hair if needed.  Don't smoke or vape the morning of surgery. No chewing tobacco for 6 hours before you arrive. A nicotine patch is okay. You may spit out nicotine gum when you arrive.  For some surgeries, the surgeon will tell you to fully quit smoking and nicotine.  We will make every effort to keep you safe from infection. We will:  Clean our hands often with soap and water (or an alcohol-based hand rub).  Clean the skin at your surgery site with a special soap that kills  germs.  Give you a special gown to keep you warm. (Cold raises the risk of infection.)  Wear hair covers, masks, gowns, and gloves during surgery.  Give antibiotic medicine, if prescribed. Not all surgeries need this medicine.  What to bring on the day of surgery  Photo ID and insurance card  Copy of your health care directive, if you have one  Glasses and hearing aids (bring cases)  You can't wear contacts during surgery  Inhaler and eye drops, if you use them (tell us about these when you arrive)  CPAP machine or breathing device, if you use them  A few personal items, if spending the night  If you have . . .  A pacemaker, ICD (cardiac defibrillator), or other implant: Bring the ID card.  An implanted stimulator: Bring the remote control.  A legal guardian: Bring a copy of the certified (court-stamped) guardianship papers.  Please remove any jewelry, including body piercings. Leave jewelry and other valuables at home.  If you're going home the day of surgery  You must have a responsible adult drive you home. They should stay with you overnight as well.  If you don't have someone to stay with you, and you aren't safe to go home alone, we may keep you overnight. Insurance often won't pay for this.  After surgery  If it's hard to control your pain or you need more pain medicine, please call your surgeon's office.  Questions?   If you have any questions for your care team, list them here:   ____________________________________________________________________________________________________________________________________________________________________________________________________________________________________________________________  For informational purposes only. Not to replace the advice of your health care provider. Copyright   2003, 2019 [facility]. All rights reserved. Clinically reviewed by Ajit Moss MD. [place] [id number] - REV 08/24.

## 2025-04-04 NOTE — PROGRESS NOTES
Preoperative Evaluation  [facility]  [address]  [facility] [address]  Phone: [phone]  Fax: [phone]  Primary Provider: Physician No Ref-Primary  Pre-op Performing Provider: GERMAN West CNP  Apr 4, 2025 4/4/2025   Surgical Information   What procedure is being done? breast aug   Facility or Hospital where procedure/surgery will be performed: [facility]   Who is doing the procedure / surgery? ashish pozo   Date of surgery / procedure: april 30   Time of surgery / procedure: 11   Where do you plan to recover after surgery? at home with family     Fax number for surgical facility: [phone]    Assessment & Plan     The proposed surgical procedure is considered LOW risk.    Preop general physical exam  39-year-old female without a significant past medical history here for preoperative exam for upcoming breast augmentation procedure.  She has no history of heart attack, stroke, diabetes.  She is currently on amoxicillin for positive strep test.  She otherwise is healthy and has no concerns.  Will obtain preop labs today.  She is on no chronic medications.  Discussed avoiding ibuprofen, supplements, herbs.  Okay to proceed with procedure as scheduled  - Hemoglobin; Future  - Basic metabolic panel  (Ca, Cl, CO2, Creat, Gluc, K, Na, BUN); Future  - Hemoglobin  - Basic metabolic panel  (Ca, Cl, CO2, Creat, Gluc, K, Na, BUN)    Encounter for breast augmentation              - No identified additional risk factors other than previously addressed    Preoperative Medication Instructions  Antiplatelet or Anticoagulation Medication Instructions   - We reviewed the medication list and the patient is not on an antiplatelet or anticoagulation medications.    Additional Medication Instructions   - Herbal medications and vitamins: DO NOT TAKE 14 days prior to surgery.    Recommendation  Approval given to proceed with proposed  procedure, without further diagnostic evaluation.    Luna Fernandez is a 39 year old, presenting for the following:  Pre-Op Exam (Dos 4/30)          4/4/2025    10:11 AM   Additional Questions   Roomed by Lorrie SWAN:     No chest pain or shortness of breath   No dysuria    No chronic medications               4/4/2025   Pre-Op Questionnaire   Have you ever had a heart attack or stroke? No   Have you ever had surgery on your heart or blood vessels, such as a stent placement, a coronary artery bypass, or surgery on an artery in your head, neck, heart, or legs? No   Do you have chest pain with activity? No   Do you have a history of heart failure? No   Do you currently have a cold, bronchitis or symptoms of other infection? (!) YES, tested positive for Strep on 4/3   Do you have a cough, shortness of breath, or wheezing? No   Do you or anyone in your family have previous history of blood clots? No   Do you or does anyone in your family have a serious bleeding problem such as prolonged bleeding following surgeries or cuts? No   Have you ever had problems with anemia or been told to take iron pills? No   Have you had any abnormal blood loss such as black, tarry or bloody stools, or abnormal vaginal bleeding? No   Have you ever had a blood transfusion? No   Are you willing to have a blood transfusion if it is medically needed before, during, or after your surgery? Yes   Have you or any of your relatives ever had problems with anesthesia? No   Do you have sleep apnea, excessive snoring or daytime drowsiness? No   Do you have any artifical heart valves or other implanted medical devices like a pacemaker, defibrillator, or continuous glucose monitor? No   Do you have artificial joints? No   Are you allergic to latex? No     Health Care Directive  Patient does not have a Health Care Directive: Discussed advance care planning with patient; however, patient declined at this time.    Preoperative Review of     reviewed - no record of controlled substances prescribed.      Status of Chronic Conditions:  See problem list for active medical problems.  Problems all longstanding and stable, except as noted/documented.  See ROS for pertinent symptoms related to these conditions.    Patient Active Problem List    Diagnosis Date Noted    Currently pregnant 08/12/2022     Priority: Medium    Encounter for triage in pregnant patient 08/09/2022     Priority: Medium    Wrist injury 05/22/2009     Priority: Medium      Past Medical History:   Diagnosis Date    ASCUS on Pap smear 3/09    + HR HPV, colpo benign    Heartburn      Past Surgical History:   Procedure Laterality Date    APPENDECTOMY      LAPAROSCOPIC CHOLECYSTECTOMY N/A 1/20/2021    Procedure: CHOLECYSTECTOMY, LAPAROSCOPIC;  Surgeon: Zeina Rodríguez MD;  Location: [facility];  Service: General     Current Outpatient Medications   Medication Sig Dispense Refill    amoxicillin (AMOXIL) 500 MG tablet Take 500 mg by mouth.      cyclobenzaprine (FLEXERIL) 10 MG tablet Take 10 mg by mouth. (Patient not taking: Reported on 4/4/2025)      hydrOXYzine (ATARAX) 25 MG tablet Take 1 tablet (25 mg) by mouth 3 times daily as needed for anxiety (Patient not taking: Reported on 4/4/2025) 40 tablet 0    Prenatal Vit-Fe Fumarate-FA (PRENATAL MULTIVITAMIN W/IRON) 27-0.8 MG tablet Take 1 tablet by mouth daily (Patient not taking: Reported on 4/4/2025)      sertraline (ZOLOFT) 25 MG tablet Take 1 tablet (25 mg) by mouth daily for 7 days, THEN 2 tablets (50 mg) daily for 60 days. 67 tablet 0       Allergies   Allergen Reactions    Morphine And Codeine Itching and Rash        Social History     Tobacco Use    Smoking status: Former    Smokeless tobacco: Never    Tobacco comments:     Smoked three years and stopped 2 years ago.   Substance Use Topics    Alcohol use: Not Currently     Comment: Alcoholic Drinks/day: Works as a , may drink after work.       History   Drug Use No        "        Objective    /62 (BP Location: Left arm, Patient Position: Sitting, Cuff Size: Adult Regular)   Pulse 86   Temp 97.7  F (36.5  C) (Oral)   Resp 16   Ht 1.676 m (5' 6\")   Wt 85.5 kg (188 lb 8 oz)   SpO2 99%   Breastfeeding No   BMI 30.42 kg/m     Estimated body mass index is 30.42 kg/m  as calculated from the following:    Height as of this encounter: 1.676 m (5' 6\").    Weight as of this encounter: 85.5 kg (188 lb 8 oz).  Physical Exam  GENERAL: alert and no distress  NECK: no adenopathy, no asymmetry, masses, or scars  RESP: lungs clear to auscultation - no rales, rhonchi or wheezes  CV: regular rate and rhythm, normal S1 S2, no S3 or S4, no murmur, click or rub, no peripheral edema  ABDOMEN: soft, nontender, no hepatosplenomegaly, no masses and bowel sounds normal  MS: no gross musculoskeletal defects noted, no edema  NEURO: Normal strength and tone, mentation intact and speech normal        Diagnostics  Recent Results (from the past week)   COVID-19 VIRUS (CORONAVIRUS) BY PCR (EXTERNAL RESULT)    Collection Time: 04/03/25  9:42 AM   Result Value Ref Range    COVID-19 Virus by PCR (External Result) Negative Negative   Hemoglobin    Collection Time: 04/04/25 10:58 AM   Result Value Ref Range    Hemoglobin 12.6 11.7 - 15.7 g/dL   Basic metabolic panel  (Ca, Cl, CO2, Creat, Gluc, K, Na, BUN)    Collection Time: 04/04/25 10:58 AM   Result Value Ref Range    Sodium 139 135 - 145 mmol/L    Potassium 4.3 3.4 - 5.3 mmol/L    Chloride 105 98 - 107 mmol/L    Carbon Dioxide (CO2) 23 22 - 29 mmol/L    Anion Gap 11 7 - 15 mmol/L    Urea Nitrogen 9.3 6.0 - 20.0 mg/dL    Creatinine 0.70 0.51 - 0.95 mg/dL    GFR Estimate >90 >60 mL/min/1.73m2    Calcium 9.3 8.8 - 10.4 mg/dL    Glucose 106 (H) 70 - 99 mg/dL      No EKG required, no history of coronary heart disease, significant arrhythmia, peripheral arterial disease or other structural heart disease.    Revised Cardiac Risk Index (RCRI)  The patient has the " following serious cardiovascular risks for perioperative complications:   - No serious cardiac risks = 0 points     RCRI Interpretation: 0 points: Class I (very low risk - 0.4% complication rate)         Signed Electronically by: GERMAN West CNP  A copy of this evaluation report is provided to the requesting physician.

## 2025-04-11 ENCOUNTER — TELEPHONE (OUTPATIENT)
Dept: FAMILY MEDICINE | Facility: CLINIC | Age: 40
End: 2025-04-11
Payer: COMMERCIAL

## 2025-04-11 NOTE — TELEPHONE ENCOUNTER
Forms/Letter Request    Type of form/letter: OTHER: preop done 4/4       Do we have the form/letter: Yes: pt had preop and labs done 4/4 - needing preop info faxed to [facility]    Who is the form from? Patient    Where did/will the form come from? form was faxed in    When is form/letter needed by: asap -     How would you like the form/letter returned: Fax : [phone]  [facility]    Patient Notified form requests are processed in 5-7 business days:No    Could we send this information to you in [employer]! or would you prefer to receive a phone call?:   Patient would prefer a phone call or [employer]  Okay to leave a detailed message?: Yes at Home number on file [phone] (home)

## 2025-04-14 ENCOUNTER — MYC MEDICAL ADVICE (OUTPATIENT)
Dept: FAMILY MEDICINE | Facility: CLINIC | Age: 40
End: 2025-04-14
Payer: COMMERCIAL

## 2025-04-15 ENCOUNTER — DOCUMENTATION ONLY (OUTPATIENT)
Dept: FAMILY MEDICINE | Facility: CLINIC | Age: 40
End: 2025-04-15
Payer: COMMERCIAL

## 2025-04-15 DIAGNOSIS — Z01.818 PREOP GENERAL PHYSICAL EXAM: Primary | ICD-10-CM

## 2025-04-15 NOTE — PROGRESS NOTES
Hello,  Patient is requesting a pregnancy test for a pre op.    Would you be able to place this order for patient?  They are coming in tomorrow.    Thank you.  Aleah Alston on 4/15/2025 at 12:18 PM

## 2025-04-16 ENCOUNTER — LAB (OUTPATIENT)
Dept: LAB | Facility: CLINIC | Age: 40
End: 2025-04-16
Payer: COMMERCIAL

## 2025-04-16 DIAGNOSIS — Z01.818 PREOP GENERAL PHYSICAL EXAM: ICD-10-CM

## 2025-04-16 LAB — HCG UR QL: NEGATIVE

## 2025-04-16 PROCEDURE — 81025 URINE PREGNANCY TEST: CPT
